# Patient Record
Sex: MALE | Race: WHITE | Employment: STUDENT | URBAN - NONMETROPOLITAN AREA
[De-identification: names, ages, dates, MRNs, and addresses within clinical notes are randomized per-mention and may not be internally consistent; named-entity substitution may affect disease eponyms.]

---
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
## 2022-03-04 ENCOUNTER — HOSPITAL ENCOUNTER (OUTPATIENT)
Dept: GENERAL RADIOLOGY | Age: 22
Discharge: HOME | End: 2022-03-06
Payer: COMMERCIAL

## 2022-03-04 ENCOUNTER — OFFICE VISIT (OUTPATIENT)
Dept: PRIMARY CARE CLINIC | Age: 22
End: 2022-03-04
Payer: COMMERCIAL

## 2022-03-04 VITALS
OXYGEN SATURATION: 99 % | TEMPERATURE: 96 F | HEIGHT: 73 IN | BODY MASS INDEX: 23.86 KG/M2 | SYSTOLIC BLOOD PRESSURE: 118 MMHG | WEIGHT: 180 LBS | DIASTOLIC BLOOD PRESSURE: 68 MMHG | HEART RATE: 56 BPM

## 2022-03-04 DIAGNOSIS — S62.501A CLOSED AVULSION FRACTURE OF PHALANX OF RIGHT THUMB, INITIAL ENCOUNTER: Primary | ICD-10-CM

## 2022-03-04 DIAGNOSIS — R52 PAIN: ICD-10-CM

## 2022-03-04 PROCEDURE — L3908 WHO COCK-UP NONMOLDE PRE OTS: HCPCS | Performed by: NURSE PRACTITIONER

## 2022-03-04 PROCEDURE — 73130 X-RAY EXAM OF HAND: CPT

## 2022-03-04 PROCEDURE — 99213 OFFICE O/P EST LOW 20 MIN: CPT | Performed by: NURSE PRACTITIONER

## 2022-03-04 PROCEDURE — 99212 OFFICE O/P EST SF 10 MIN: CPT | Performed by: NURSE PRACTITIONER

## 2022-03-04 ASSESSMENT — PATIENT HEALTH QUESTIONNAIRE - PHQ9
SUM OF ALL RESPONSES TO PHQ QUESTIONS 1-9: 0
SUM OF ALL RESPONSES TO PHQ9 QUESTIONS 1 & 2: 0
SUM OF ALL RESPONSES TO PHQ QUESTIONS 1-9: 0
2. FEELING DOWN, DEPRESSED OR HOPELESS: 0
1. LITTLE INTEREST OR PLEASURE IN DOING THINGS: 0
SUM OF ALL RESPONSES TO PHQ QUESTIONS 1-9: 0
SUM OF ALL RESPONSES TO PHQ QUESTIONS 1-9: 0

## 2022-03-04 NOTE — PROGRESS NOTES
reviewed. Subjective:      Review of Systems   Constitutional: Negative for activity change, appetite change, fatigue and fever. Musculoskeletal:        Right thumb pain   Neurological: Negative for tingling and numbness. Objective:      Physical Exam  Vitals and nursing note reviewed. Constitutional:       Appearance: Normal appearance. HENT:      Head: Normocephalic and atraumatic. Cardiovascular:      Rate and Rhythm: Normal rate and regular rhythm. Heart sounds: Normal heart sounds. Pulmonary:      Breath sounds: Normal breath sounds. Musculoskeletal:      Right hand: Tenderness present. Decreased range of motion (due to pain). Normal strength. Normal sensation. Arms:    Skin:     Capillary Refill: Capillary refill takes less than 2 seconds. Neurological:      General: No focal deficit present. Mental Status: He is alert and oriented to person, place, and time. XR HAND RIGHT (MIN 3 VIEWS)  Narrative: EXAMINATION:  THREE XRAY VIEWS OF THE RIGHT HAND    3/4/2022 4:35 pm    COMPARISON:  None. HISTORY:  ORDERING SYSTEM PROVIDED HISTORY: Pain  TECHNOLOGIST PROVIDED HISTORY:  thumb pain x3 weeks hit against a backboard  Reason for Exam: recheck of 1st digit injury 3 weeks ago    FINDINGS:  There is a small crescentic opacity that is seen along the radial aspect of  the thumb just proximal to the metacarpophalangeal joint. In the appropriate  clinical setting this could represent a small avulsion fracture. Impression: Possible small avulsion fracture as described above        Assessment:       Diagnosis Orders   1. Closed avulsion fracture of phalanx of right thumb, initial encounter  Woo Castellano MD, Orthopedic Surgery, [place]   2.  Pain  XR HAND RIGHT (MIN 3 VIEWS)       Plan:      Reviewed hand xray with patient  Thumb spica applied  Will refer to orthopedics  Ice to affected area  Tylenol as needed  Return if symptoms do not improve or worsen Return PRN   No follow-ups on file. Orders Placed This Encounter   Procedures    XR HAND RIGHT (MIN 3 VIEWS)     Standing Status:   Future     Number of Occurrences:   1     Standing Expiration Date:   3/4/2023     Order Specific Question:   Reason for exam:     Answer:   thumb pain x3 weeks hit against a backboard   Maninder Velez MD, Orthopedic Surgery, [place]     Referral Priority:   Routine     Referral Type:   Eval and Treat     Referral Reason:   Specialty Services Required     Referred to Provider:   Mich Miranda MD     Requested Specialty:   Orthopedic Surgery     Number of Visits Requested:   1     No orders of the defined types were placed in this encounter. Patient given educational materials - see patient instructions. All patient questionsanswered. Pt voiced understanding. Reviewed health maintenance.      Electronically signed by ROJAS Giraldo - CNP, CNP on 3/4/2022 at 5:54 PM

## 2022-03-10 ENCOUNTER — OFFICE VISIT (OUTPATIENT)
Dept: ORTHOPEDIC SURGERY | Age: 22
End: 2022-03-10
Payer: COMMERCIAL

## 2022-03-10 VITALS
BODY MASS INDEX: 23.86 KG/M2 | SYSTOLIC BLOOD PRESSURE: 134 MMHG | DIASTOLIC BLOOD PRESSURE: 89 MMHG | HEART RATE: 61 BPM | WEIGHT: 180 LBS | HEIGHT: 73 IN

## 2022-03-10 DIAGNOSIS — S63.641A SPRAIN OF METACARPOPHALANGEAL (MCP) JOINT OF RIGHT THUMB, INITIAL ENCOUNTER: Primary | ICD-10-CM

## 2022-03-10 PROCEDURE — 99212 OFFICE O/P EST SF 10 MIN: CPT | Performed by: FAMILY MEDICINE

## 2022-03-10 PROCEDURE — 99203 OFFICE O/P NEW LOW 30 MIN: CPT | Performed by: FAMILY MEDICINE

## 2022-03-10 PROCEDURE — L3924 HFO WITHOUT JOINTS PRE OTS: HCPCS | Performed by: FAMILY MEDICINE

## 2022-03-10 NOTE — PROGRESS NOTES
Sports Medicine Consultation    CHIEF COMPLAINT:  Hand Injury (right thumb injury/ 3week old injury)    HPI:  The patient is a 25 y.o. male who is being seen for  new patient being seen for regarding new problem of  Right 1st finger pain. The patient is a right hand dominant male who has had right hand/wrist pain for 1 montn. As far as trauma to the hand the patient indicates playing basket ball when he blocked a shot and five minutes later was in pain, next morning hand looked purple and swollen  The following medications/interventions have been tried: tylenol, wrapped up for work and ice at night without benefit. Denies numbness/ tingling. he has a past medical history of Asthma. he has no past surgical history on file. family history is not on file. Social History     Socioeconomic History    Marital status: Single     Spouse name: Not on file    Number of children: Not on file    Years of education: Not on file    Highest education level: Not on file   Occupational History    Not on file   Tobacco Use    Smoking status: Never Smoker    Smokeless tobacco: Never Used   Substance and Sexual Activity    Alcohol use: No    Drug use: No    Sexual activity: Not on file   Other Topics Concern    Not on file   Social History Narrative    Not on file     Social Determinants of Health     Financial Resource Strain:     Difficulty of Paying Living Expenses: Not on file   Food Insecurity:     Worried About Running Out of Food in the Last Year: Not on file    Mague of Food in the Last Year: Not on file   Transportation Needs:     Lack of Transportation (Medical): Not on file    Lack of Transportation (Non-Medical):  Not on file   Physical Activity:     Days of Exercise per Week: Not on file    Minutes of Exercise per Session: Not on file   Stress:     Feeling of Stress : Not on file   Social Connections:     Frequency of Communication with Friends and Family: Not on file    nausea or vomiting, and blood in stools. :  Denies frequent urination, burning or painful urination, blood in the urine, and bladder incontinence. NEURO:  Denies headache, memory loss, sleep disturbance, and tremor or movement disorder. PHYSICAL EXAM:    SKIN:  Intact without rashes, lesions or ulcerations. No obvious deformity or swelling. EYES:  Extraocular muscles intact. MOUTH: Oral mucosa moist.  No perioral lesions. PULM:  Respirations unlabored and regular. VASC:  Capillary refill less than 2 seconds. Hand/Wrist Exam  ROM:  Full flexor and extensor tendon function intact   Finger, hand, and wrist range of motion are WNL  Inspection-Deformity: yes  Palpation-Tenderness: rcl at mcp  There is is not thenar and is not interosseous atrophy. Strength- WNL  NEURO: Radial, ulnar, and median nerves are intact to motor and sensory testing. Two point discrimination is less than six mm. There is not decreased sensation to light touch and pinprick in the median and ulnar nerve distribution. CTS: Tinel's test at the wrist is negative. Flexion compression test negative  Phalen's test is negative. Finkelstein's test is negative. Elbow:  Range of motion and strength about the elbow is intact. Tinel's test is negative at the elbow. Cerv:  Full pain free range of motion with a negative Spurling's test.    RADIOLOGY: No results found. IMPRESSION:     1. Sprain of metacarpophalangeal (MCP) joint of right thumb, initial encounter        PLAN:   We discussed some of the etiologies and natural histories of     ICD-10-CM    1. Sprain of metacarpophalangeal (MCP) joint of right thumb, initial encounter  S63.641A Hfo without joints pre ots 2     We discussed the various treatment alternatives including anti-inflammatory medications, physical therapy, injections, further imaging studies and as a last resort surgery.   This point patient has what appears to be sprain of the RCL at the MCP I think we can treat him conservatively with a thumb spica splint I think the calcification is from the healing reaction being a month out we will place him in that splint and see him back otherwise as needed    No follow-ups on file. Please be aware portions of this note were completed using voice recognition software and unforeseen errors may have occurred    Electronically signed by Omid Mejía DO, FAOASM  on 3/10/22 at 9:59 AM EST          Procedures    Hfo without joints pre ots 2     Patient was prescribed a Exos Short Thumb Spica Brace. The right  thumb will require stabilization / immobilization from this semi-rigid / rigid orthosis to improve their function. The orthosis will assist in protecting the affected area, provide functional support and facilitate healing. The orthosis used a heating element to mold and shape the brace to provide a customizable fit for the patient. The patient was educated and fit by a healthcare professional with expert knowledge and specialization in brace application while under the direct supervision of the treating physician. Verbal and written instructions for the use of and application of this item were provided.    They were instructed to contact the office immediately should the brace result in increased pain, decreased sensation, increased swelling or worsening of the condition

## 2022-03-31 ENCOUNTER — OFFICE VISIT (OUTPATIENT)
Dept: ORTHOPEDIC SURGERY | Age: 22
End: 2022-03-31
Payer: COMMERCIAL

## 2022-03-31 VITALS
BODY MASS INDEX: 23.86 KG/M2 | WEIGHT: 180 LBS | HEART RATE: 64 BPM | HEIGHT: 73 IN | SYSTOLIC BLOOD PRESSURE: 120 MMHG | DIASTOLIC BLOOD PRESSURE: 72 MMHG

## 2022-03-31 DIAGNOSIS — S63.641A SPRAIN OF METACARPOPHALANGEAL (MCP) JOINT OF RIGHT THUMB, INITIAL ENCOUNTER: Primary | ICD-10-CM

## 2022-03-31 PROCEDURE — 99212 OFFICE O/P EST SF 10 MIN: CPT | Performed by: FAMILY MEDICINE

## 2022-03-31 PROCEDURE — 99213 OFFICE O/P EST LOW 20 MIN: CPT | Performed by: FAMILY MEDICINE

## 2022-03-31 NOTE — PROGRESS NOTES
Sports Medicine Consultation    CHIEF COMPLAINT:  Finger Pain (rech right thumb)      HPI:  The patient is a 23 y.o. male who is being seen for   established patient being seen for regarding follow up of a pre-existing problem of  r thumb. The patient is a right hand dominant male who has had right hand/wrist pain for 8 weeks. As far as trauma to the hand the patient indicates pain after blocking a shot playing basketball. The following medications/interventions have been tried: spint without benefit. he has a past medical history of Asthma. he has no past surgical history on file. family history is not on file. Social History     Socioeconomic History    Marital status: Single     Spouse name: Not on file    Number of children: Not on file    Years of education: Not on file    Highest education level: Not on file   Occupational History    Not on file   Tobacco Use    Smoking status: Never Smoker    Smokeless tobacco: Never Used   Substance and Sexual Activity    Alcohol use: No    Drug use: No    Sexual activity: Not on file   Other Topics Concern    Not on file   Social History Narrative    Not on file     Social Determinants of Health     Financial Resource Strain:     Difficulty of Paying Living Expenses: Not on file   Food Insecurity:     Worried About Running Out of Food in the Last Year: Not on file    Mague of Food in the Last Year: Not on file   Transportation Needs:     Lack of Transportation (Medical): Not on file    Lack of Transportation (Non-Medical):  Not on file   Physical Activity:     Days of Exercise per Week: Not on file    Minutes of Exercise per Session: Not on file   Stress:     Feeling of Stress : Not on file   Social Connections:     Frequency of Communication with Friends and Family: Not on file    Frequency of Social Gatherings with Friends and Family: Not on file    Attends [employer] Services: Not on file   [employer] of Clubs or Organizations: Not on file    Attends Club or Organization Meetings: Not on file    Marital Status: Not on file   Intimate Partner Violence:     Fear of Current or Ex-Partner: Not on file    Emotionally Abused: Not on file    Physically Abused: Not on file    Sexually Abused: Not on file   Housing Stability:     Unable to Pay for Housing in the Last Year: Not on file    Number of Tj in the Last Year: Not on file    Unstable Housing in the Last Year: Not on file       Current Outpatient Medications   Medication Sig Dispense Refill    VITAMIN D PO Take by mouth      Ascorbic Acid (VITAMIN C ER PO) Take by mouth      Fexofenadine HCl (ALLEGRA ALLERGY PO) Take 1 tablet by mouth       ondansetron (ZOFRAN-ODT) 4 MG disintegrating tablet Take 1 tablet by mouth every 8 hours as needed for Nausea or Vomiting 15 tablet 0    fluticasone (FLONASE) 50 MCG/ACT nasal spray INHALE 1 SPRAY IN EACH NOSTRIL ONE TIME A DAY  11    VENTOLIN  (90 Base) MCG/ACT inhaler INHALE 2 PUFFS ORALLY EVERY FOUR HOURS AS NEEDED  2    loratadine (CLARITIN) 10 MG capsule Take 10 mg by mouth daily       albuterol (PROVENTIL) (2.5 MG/3ML) 0.083% nebulizer solution Take 3 mLs by nebulization every 6 hours as needed for Wheezing or Shortness of Breath 120 vial 0     No current facility-administered medications for this visit. Allergies:  heis allergic to other and bee venom. ROS:  CV:  Denies chest pain; palpitations; shortness of breath; swelling of feet, ankles; and loss of consciousness. CON: Denies fever and dizziness. ENT:  Denies hearing loss / ringing, ear infections hoarseness, and swallowing problems. RESP:  Denies chronic cough, spitting up blood, and asthma/wheezing. GI: Denies abdominal pain, change in bowel habits, nausea or vomiting, and blood in stools. :  Denies frequent urination, burning or painful urination, blood in the urine, and bladder incontinence.   NEURO:  Denies headache, memory loss, sleep disturbance, and tremor or movement disorder. PHYSICAL EXAM:    SKIN:  Intact without rashes, lesions or ulcerations. No obvious deformity or swelling. EYES:  Extraocular muscles intact. MOUTH: Oral mucosa moist.  No perioral lesions. PULM:  Respirations unlabored and regular. VASC:  Capillary refill less than 2 seconds. Hand/Wrist Exam  ROM:  Full flexor and extensor tendon function intact   Finger, hand, and wrist range of motion are WNL  Inspection-Deformity: no  Palpation-Tenderness: no  There is is not thenar and is not interosseous atrophy. Strength- WNL  NEURO: Radial, ulnar, and median nerves are intact to motor and sensory testing. Two point discrimination is less than six mm. There is not decreased sensation to light touch and pinprick in the median and ulnar nerve distribution. CTS: Tinel's test at the wrist is negative. Flexion compression test negative  Phalen's test is negative. Finkelstein's test is negative. Elbow:  Range of motion and strength about the elbow is intact. Tinel's test is negative at the elbow. Cerv:  Full pain free range of motion with a negative Spurling's test.    RADIOLOGY: No results found. IMPRESSION:     1. Sprain of metacarpophalangeal (MCP) joint of right thumb, initial encounter        PLAN:   We discussed some of the etiologies and natural histories of     ICD-10-CM    1. Sprain of metacarpophalangeal (MCP) joint of right thumb, initial encounter  I39.936P      We discussed the various treatment alternatives including anti-inflammatory medications, physical therapy, injections, further imaging studies and as a last resort surgery. At this point his stability is completely returned he really has no significant pain on exam progressing out of splint allow him to return to activity encouragement was given his follow-up will be otherwise as needed    No follow-ups on file.     Please be aware portions of this note were completed using voice recognition software and unforeseen errors may have occurred    Electronically signed by Andreas Lr DO, FAOASM  on 3/31/22 at 8:55 AM EDT      No orders of the defined types were placed in this encounter.

## 2023-01-19 ENCOUNTER — HOSPITAL ENCOUNTER (OUTPATIENT)
Dept: PHYSICAL THERAPY | Facility: CLINIC | Age: 23
Setting detail: THERAPIES SERIES
Discharge: HOME | End: 2023-01-19
Payer: COMMERCIAL

## 2023-01-19 PROCEDURE — 97161 PT EVAL LOW COMPLEX 20 MIN: CPT

## 2023-01-19 PROCEDURE — 97110 THERAPEUTIC EXERCISES: CPT

## 2023-01-19 NOTE — CONSULTS
[facility] &  Therapy  [address]  [phone]  F: [phone] [] [address] [facility] [address]   [address]  P: [phone]  F: [phone] [] [place] &  Therapy  [address] [facility]  P: [phone]  F: [phone] [x] [address]  P: [phone]  F: [phone] [] [address]  [facility]   [address]   [place]: [phone]  F: [phone]      Physical Therapy Foot and Ankle Evaluation    Date:  2023  Patient: Camille Torres  : 2000  MRN: [id number]  Physician: Romeo Andres   Insurance: [employer]- 61 visits- combined- [employer]  Medical Diagnosis: M25.579 (ICD-10-CM) - Pain in ankle    Rehab Codes: M25.579 (ICD-10-CM) - Pain in ankle; R26.2; M62.81; M76.70  Onset date: 2022; see below  Next Dr's appt.: end of 2023    Subjective:   CC:    L > R wrist pain- worse with guitar playing; joint pain- plan to focus upon B foot/ankle pain today- see below. L ankle pain began this past July with attempting to stand from a table, however, L foot/ankle became entangled around the table leg and pain/symptoms immediately began. Symptoms did not improve- attempted to run- and then R foot/ankle pain began. Denies h/o of these pain and symptoms- as well as PT services. Visited chiro- issued orthotics- slight relief- however- continued pain with \"lifting something; applying any type of pressure. \"     HPI: 2022; see above     PMHx: [x] Unremarkable [] Diabetes [] HTN  [] Pacemaker   [] MI/Heart Problems [] Cancer [] Arthritis [x] Other: food allergies to peanuts, tree nuts, seafood; h/o PT services in middle school for adverse reaction/loss of walking post spinal tap.                 [x] Refer to full medical chart  In EPIC     Tests: [x] X-Ray: [] MRI:  [] Other: nothing significant for either foot or ankle. 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 Comorbidities: NONE  [] Obesity [] Dialysis  [] Other:   [] Asthma/COPD [] Dementia [] Other:   [] Stroke [] Sleep apnea [] Other:   [] Vascular disease [] Rheumatic disease [] Other:     ADL/IADL Previous level of function Current level of function Who currently assists the patient with task   Bathing  [x] Independent  [] Assist [x] Independent  [] Assist    Dress/grooming [x] Independent  [] Assist [x] Independent  [] Assist    Transfer/mobility [x] Independent  [] Assist [x] Independent  [] Assist    Feeding [x] Independent  [] Assist [x] Independent  [] Assist    Toileting [x] Independent  [] Assist [x] Independent  [] Assist    Driving [x] Independent  [] Assist [x] Independent  [] Assist    Housekeeping [x] Independent  [] Assist [x] Independent  [] Assist    Grocery shop/meal prep [x] Independent  [] Assist [x] Independent  [] Assist      Gait Prior level of function Current level of function    [x] Independent  [] Assist [x] Independent  [] Assist   Device: [] Independent [] Independent    [] Straight Cane [] Quad cane [] Straight Cane [] Quad cane    [] Standard walker [] Rolling walker   [] 4 wheeled walker [] Standard walker [] Rolling walker   [] 4 wheeled walker    [] Wheelchair [] Wheelchair     Denies donning braces or sleeves upon either foot/ankle. Intermittent pain and difficulty with stair negotiation. Intermittent pain with ambulation.      Pain:  [x] Yes  [] No Location: L > R peroneal distribution [intermittent into achilles- L only]; denies heel and arch pain  Pain Rating: (0-10 scale) 0-7-8/10- over this past week   Pain altered Tx:  [] Yes  [x] No  Action: N/A  Symptoms:  [] Improving [] Worsening [x] Same  Better:  [] AM    [] PM    [] Sit    [] Rise/Sit    []Stand    [] Walk    [] Lying    [x] Other: no relief with cryotherapy. Worse: [] AM    [] PM    [] Sit    [] Rise/Sit    []Stand    [] Walk    [] Lying    [] Bend                             [] Valsalva    [x] Other: reports pain with hunting; unable to run or play basketball for past several months secondary to inc pain and symptoms. \"Weird rotations when walking. \"   Sleep: [x] OK    [] Disturbed    Frequent clicking, popping of each foot/ankle- no pain and no relief. Denies edema, ecchymosis. Denies N/T, changes in sensation.    Denies h/o B LE DVT, PE.     Gait: NO SIG DEVIATIONS      Left Right Comments   WB status      Assistive device      Deviations      Other        Visual inspection:     Left Right Comments   Alignment- varus/valgus/pes  planus/pes cavus x x Slight pes planus B   Scars/surgical incisions      Ecchymosis      Erythema      Pitting edema      Atrophy      Deformity- danisha etc      Other        Pitting Edema Scale: NONE    Grade Definition   1+ 2mm or less; disappears immediately   2+ 2-4mm; few second rebound   3+ 4-6mm; 10-12 second rebound   4+ 6-8mm; > 20 second rebound     Girth measurements: NONE     Left Right Comments   Figure eight      Malleoli      Metatarsals      Midfoot      Other        Objective:      ROM  ° A/P STRENGTH TESTS (+/-) Left Right Not Tested    Left Right Left Right       Hip Flex           Ext           ER           IR           ABD           ADD           Knee Flex           Ext                      Ankle DF 5; neutral  15; 10 4+ 4+ Anterior drawer test   x   PF 70 70 4+ 4+ Inversion stress test   x   INV <10 20 4 4+; P achilles  Eversion stress test   x   EVER < 10; P 15 4-; P 4 Spring test   x   1st MTP Flexion     Talar tilt   x   1st MTP Extension     Medardo's Sign - -    Standing  gastrocnemius - - 12/20- ceased d/t worsening P- \"shoots up the side\"  20/20 Flor's test - -      Bony Palpation:     Left Right Comments   First MTP      Navicular      Dome of Talus      Medial Malleolus      Lateral Malleolus      Fifth metatarsal      Calcaneus      Sesamoid bones      Metatarsals      Subtalar joint      Sustentaculum shelly      Other        Hypomobile L TCJ with AP, PA glides. Also limited subtalar mobility- both INV/EV glides of calcaneus. Soft Tissue Palpation:     Left Right Comments   Medial malleolus-  PTT, FDL, PTA, TN,  FHL        Lateral malleolus-  ATFL, CFL,  PTFL      Dorsum foot- TAA,  EHL,  DPA,  EDL      Plantar fascia/calcaneus      Other        Denies to all lorenza and soft tissue structures of both feet/ankles. BALANCE/PROPRIOCEPTION              [] Not tested   Single leg stance       R                     L                                PAIN   Eyes open                30             Sec. 30         Sec                  . [x] \"Ache\" along medial and lateral joint lines    Eyes closed                          Sec. Sec                  . []      FUNCTION Normal Difficult Unable   Sitting [x] [] []   Standing [x] [] []   Ambulation [] [x] []   Groom/Dress [x] [] []   Lift/Carry [x] [] []   Stairs [] [x] []   Bending [] [x] []   Squat [] [x] []   Kneel [] [x] []     Comments: LEFs is 24% impaired or limited     Assessment:  Patient is a 23 y.o. pleasant male who presents to physical therapy initial evaluation with signs and symptoms consistent with potential L TCJ hypomobility with peroneal tendinitis; concurrent L wrist pain [to be assessed]; desires to run, play basketball without pain. Patient demonstrates impairments and symptoms as detailed below in therapy goals.  Patient currently limited in ambulation, stair negotiation, running, basketball secondary to these impairments and increased symptoms. Patient would benefit from continued physical therapy services in order to address these impairments and symptoms, and return to QOL, ADLs, work. Anticipated frequency detailed above. Prognosis not limited d/t secondary factors- justifying a low complexity evaluation. If unable to achieve significant improvements within six to twelve visits, will consult referring physician and consider a follow-up visit with said physician. Patient verbalized understanding and agreement to all aforementioned statements. Would benefit from PT services in order to: inc L foot/ankle A/PROM; L foot/ankle gross strength; and overall improve tolerance for ambulation, stair negotiation, running and basketball. Problems:    [x] ? Pain:     [x] ? ROM:    [x] ? Strength:    [x] ? Function:    [x] ? Balance  [] Edema:  [x] Postural Deviations  [] Gait Deviations  [] Other:      STG: (to be met in 6 treatments)  ? Pain: Patient will report < 5/10 pain with active movement [ambulation] in order to improve QOL. ? ROM: Will demo L ankle AROM- all- to near match R in order to improve functional mobility. Will also deny P with AROM L ankle eversion in order to indicate dec inflammation within peroneals. ? Strength: Will demo 4+/5 L foot/ankle gross strength without P- especially resisted EV. Will demo x20 SL HR of L LE with < 5/10 P for improved stair negotiation. ? Function: Will ambulate x20 minutes with < 3/10 P for improved QOL. Independent with Home Exercise Programs  LTG: (to be met in 12 treatments)  Will run x1 mile with < 3/10 P for improved tolerance for routine fitness. Improve LEFS to < 24% impaired or limited                  Patient goals: \"to be able to run with no pain and not have this come back; also hurts at random times- so hopefully this helps. \"     Rehab Potential:  [x] Good  [] Fair  [] Poor   Suggested Professional Referral:  [x] No  [] Yes:  Barriers to Goal Achievement:  [x] No  [] Yes:  Domestic Concerns:  [x] No  [] Yes:    Pt. Education:  [x] Plans/Goals, Risks/Benefits discussed  [x] Home exercise program    Method of Education: [x] Verbal  [x] Demo  [x] Written  Comprehension of Education:  [x] Verbalizes understanding. [] Demonstrates understanding. [x] Needs Review. [] Demonstrates/verbalizes understanding of HEP/Ed previously given. Treatment Plan:  [x] Therapeutic Exercise   34701  [] Iontophoresis: 4 mg/mL Dexamethasone Sodium Phosphate  mAmin  73027   [x] Therapeutic Activity  95857 [] Vasopneumatic cold with compression  17155    [x] Gait Training- running mechanics  65499 [] Ultrasound   66068   [x] Neuromuscular Re-education  31208 [] Electrical Stimulation Unattended  83198   [x] Manual Therapy  33686 [] Electrical Stimulation Attended  43478   [x] Instruction in HEP  [] Lumbar/Cervical Traction  [id number]   [] Aquatic Therapy   89052 [x] Cold/hotpack    [] Massage   01058      [x] Dry Needling, 1 or 2 muscles  89583   [] Biofeedback, first 15 minutes   68931  [] Biofeedback, additional 15 minutes   23571 [x] Dry Needling, 3 or more muscles  46462     []  Medication allergies reviewed for use of    Dexamethasone Sodium Phosphate 4mg/ml     with iontophoresis treatments. Pt is not allergic. Frequency: 2 x/week for 12 visits    Todays Treatment:  Modalities: may begin vaso to L foot/ankle- next visit   Precautions: potential L TCJ hypomobility with peroneal tendinitis; concurrent L wrist pain [to be assessed]; desires to run, play basketball without pain   Exercises:  Exercise Reps/ Time Weight/ Level Comments                                 Other: Primary PT to assess wrist pain at next visit or PRN. Specific Instructions for next treatment: Consider upright bike; review of active HEP interventions; L TCJ AP glides, distraction, calcaneal mobs into INV/EV; and finish with vaso next visit.      Access Code: [email or web address]  URL: [email or web address]  Date: 01/19/2023  Prepared by: Kayla Gonzales    Exercises  Isometric Ankle Eversion at Wall - 2 x daily - 7 x weekly - 2 sets - 10 reps - 5 sec hold  Long Sitting Calf Stretch with Strap - 2 x daily - 7 x weekly - 1 sets - 3 reps - 30 sec hold  Seated Ankle Dorsiflexion Stretch - 2 x daily - 7 x weekly - 2 sets - 10 reps  Gastroc Stretch on Wall - 2 x daily - 7 x weekly - 1 sets - 3 reps - 30 sec hold  Soleus Stretch on Wall - 2 x daily - 7 x weekly - 1 sets - 3 reps - 30 sec hold  Seated Great Toe Extension - 2 x daily - 7 x weekly - 2 sets - 10 reps  Seated Lesser Toes Extension - 2 x daily - 7 x weekly - 2 sets - 10 reps  Arch Lifting - 2 x daily - 7 x weekly - 2 sets - 10 reps    Evaluation Complexity:  History (Personal factors, comorbidities) [x] 0 [] 1-2 [] 3+   Exam (limitations, restrictions) [] 1-2 [x] 3 [] 4+   Clinical presentation (progression) [x] Stable [] Evolving  [] Unstable   Decision Making [x] Low [] Moderate [] High    [x] Low Complexity [] Moderate Complexity [] High Complexity     Treatment Charges: Mins Units   [x] Evaluation       []  Low       []  Moderate       []  High 46 1   []  Modalities     [x]  Ther Exercise 10 1   []  Manual Therapy     []  Ther Activities     []  Aquatics     []  Vasocompression     []  Other       TOTAL TREATMENT TIME: 56    Time in: 1:02PM   Time Out: 1:58PM    Electronically signed by: Kalya Gonzales, PT

## 2023-01-24 ENCOUNTER — HOSPITAL ENCOUNTER (OUTPATIENT)
Dept: PHYSICAL THERAPY | Facility: CLINIC | Age: 23
Setting detail: THERAPIES SERIES
Discharge: HOME | End: 2023-01-24
Payer: COMMERCIAL

## 2023-01-24 PROCEDURE — 97016 VASOPNEUMATIC DEVICE THERAPY: CPT

## 2023-01-24 PROCEDURE — 97110 THERAPEUTIC EXERCISES: CPT

## 2023-01-24 NOTE — FLOWSHEET NOTE
[] [facility]) - [facility] &  Therapy  [address]  [phone]  F: [phone] [] [address]  [facility] [address]   [address]  P: [phone]  F: [phone] [] [place] &  Therapy  [address]  P: [phone]  F: [phone] [x] [address]  P: [phone]  F: [phone] [] [address]  [facility]   [address]   [place]: [phone]  F: [phone]      Physical Therapy Daily Treatment Note    Date:  2023  Patient Name:  Jody Martínez    :  2000  MRN: [id number]  Physician: Nadya GUZMAN 6.: Gerardo Gupta- 61 visits- combined- [employer]ARD MAX  Medical Diagnosis: M25.579 (ICD-10-CM) - Pain in ankle                Rehab Codes: M25.579 (ICD-10-CM) - Pain in ankle; R26.2; M62.81; M76.70  Onset date: 2022; see below                 Next Dr's appt.: end of 2023  Visit# / total visits: 2/ 12  Cancels/No Shows: 0/0    Subjective:    Pain:  [x] Yes  [] No  Location: L ankle  Pain Rating: (0-10 scale) 4/10  Pain altered Tx:  [x] No  [] Yes  Action: N/A  Comments: Pt reports he has noticed pain in R ankle as well, in the same place.      Objective:  Modalities: Vaso to L ankle: 34 deg, MED for x15' post   Precautions: potential L TCJ hypomobility with peroneal tendinitis; concurrent L wrist pain [to be assessed]; desires to run, play basketball without pain   Exercises:  Exercise Reps/ Time Weight/ Level Comments    Bike  6' Seat 4  x           D1/D2 PNF Pattern  20x     x   Gastroc str  3x30\"   x   Soleus str  3x30\"   x                 SLS eyes closed   2x30\"     x   3 way reach    2x10 ea      x   Eccentric heel raise   2x10 ea      x              Other: Primary PT to assess wrist pain at next visit or PRN. Manual:  A/P Mobes to R ankle  (consider bilat next) in supine and in standing. LAD to R TCJ     Specific Instructions for next treatment: Consider calcaneal mobs into INV/EV next visit. Treatment Charges: Mins Units   []  Modalities     [x]  Ther Exercise 40 3   []  Manual Therapy     []  Ther Activities     []  Aquatics     [x]  Vasocompression 15 1   []  Other     Total Treatment time 55 4     Assessment: [x] Progressing toward goals. Began session with upright bike to prepare soft tissue for stretching and strengthening exercises. PTA then completed TCJ mobilizations in supine and in standing with good sophia. Pt then completed D1/D2 PNF for neuro-re-ed and mobility with good sophia. Progressed with standing gastroc and soleus stretches on slantboard as well as SLS exercises for strength and stability of B ankles. Ended with vaso for pain and inflammation management. Updated HEP as listed above with verbal understanding given by pt.     [] No change. [] Other:  [x] Patient would continue to benefit from skilled physical therapy services in order to: inc L foot/ankle A/PROM; L foot/ankle gross strength; and overall improve tolerance for ambulation, stair negotiation, running and basketball. STG: (to be met in 6 treatments)  ? Pain: Patient will report < 5/10 pain with active movement [ambulation] in order to improve QOL. ? ROM: Will demo L ankle AROM- all- to near match R in order to improve functional mobility. Will also deny P with AROM L ankle eversion in order to indicate dec inflammation within peroneals. ? Strength: Will demo 4+/5 L foot/ankle gross strength without P- especially resisted EV. Will demo x20 SL HR of L LE with < 5/10 P for improved stair negotiation. ? Function: Will ambulate x20 minutes with < 3/10 P for improved QOL.    Independent with Home Exercise Programs  LTG: (to be met in 12 treatments)  Will run x1 mile with < 3/10 P for improved tolerance for routine fitness. Improve LEFS to < 24% impaired or limited                  Patient goals: \"to be able to run with no pain and not have this come back; also hurts at random times- so hopefully this helps. \"     Pt. Education:  [x] Yes  [] No  [x] Reviewed Prior HEP/Ed  Method of Education: [] Verbal  [] Demo  [] Written  Comprehension of Education:  [] Verbalizes understanding. [] Demonstrates understanding. [] Needs review. [x] Demonstrates/verbalizes HEP/Ed previously given. Access Code: [email or web address]  URL: [email or web address]  Date: 01/24/2023  Prepared by: Oni Peterson    Exercises  Isometric Ankle Eversion at [address] - 2 x daily - 7 x weekly - 2 sets - 10 reps - 5 sec hold  Long Sitting Calf Stretch with Strap - 2 x daily - 7 x weekly - 1 sets - 3 reps - 30 sec hold  Seated Ankle Dorsiflexion Stretch - 2 x daily - 7 x weekly - 2 sets - 10 reps  Gastroc Stretch on Wall - 2 x daily - 7 x weekly - 1 sets - 3 reps - 30 sec hold  Soleus Stretch on Wall - 2 x daily - 7 x weekly - 1 sets - 3 reps - 30 sec hold  Seated Great Toe Extension - 2 x daily - 7 x weekly - 2 sets - 10 reps  Seated Lesser Toes Extension - 2 x daily - 7 x weekly - 2 sets - 10 reps  Arch Lifting - 2 x daily - 7 x weekly - 2 sets - 10 reps  Standing 3-Way Kick - 1 x daily - 7 x weekly - 2 sets - 10 reps  Single Leg Balance with Eyes Closed - 1 x daily - 7 x weekly - 1 sets - 3 reps - 30 sec hold    Plan: [x] Continue current frequency toward long and short term goals. [x] Specific Instructions for subsequent treatments: Consider calcaneal mobs into INV/EV next visit.      Frequency: 2 x/week for 12 visits     Time In: 3:00PM           Time Out: 4:00PM    Electronically signed by:  Oni Peterson PTA

## 2023-01-26 ENCOUNTER — HOSPITAL ENCOUNTER (OUTPATIENT)
Dept: PHYSICAL THERAPY | Facility: CLINIC | Age: 23
Setting detail: THERAPIES SERIES
Discharge: HOME | End: 2023-01-26
Payer: COMMERCIAL

## 2023-01-26 PROCEDURE — 97140 MANUAL THERAPY 1/> REGIONS: CPT

## 2023-01-26 PROCEDURE — 97110 THERAPEUTIC EXERCISES: CPT

## 2023-01-26 PROCEDURE — 97016 VASOPNEUMATIC DEVICE THERAPY: CPT

## 2023-01-26 NOTE — FLOWSHEET NOTE
[] [facility]  [address]  [phone]  F: [phone] [] [address]  [place] [address]   [address]  P: [phone]  F: [phone] [] [place]  [facility]  [address] [facility]  P: [phone]  F: [phone] [x] [address]  P: [phone]  F: [phone] [] [address]  [facility]   [address]   [place]: [phone]  F: [phone]      Physical Therapy Daily Treatment Note    Date:  2023  Patient Name:  Leelee Cevallos    :  2000  MRN: [id number]  Physician: Nadya GUZMAN 6.: Kaitlin Mis- 61 visits- combined- [facility]  Medical Diagnosis: M25.579 (ICD-10-CM) - Pain in ankle                Rehab Codes: M25.579 (ICD-10-CM) - Pain in ankle; R26.2; M62.81; M76.70  Onset date: 2022; see below                 Next Dr's appt.: end of 2023  Visit# / total visits: 3/ 12  Cancels/No Shows: 0/0    Subjective:    Pain:  [x] Yes  [] No  Location: L > R ankle  Pain Rating: (0-10 scale) 3/10  Pain altered Tx:  [] No  [x] Yes  Action: SEE BELOW FOR MODIFICATIONS   Comments: Reports fair tolerance to first treatment session- \"I was hurting later that night [potentially for a few hours]. \" Pt denied experiencing symptoms of muscle soreness and fatigue- confirmed symptoms of his original pain- will modify per below. Reports attempting 3-way reaches at home and unable to complete without hip compensations.      Objective:  Modalities: Vaso to B ankles: 34 deg, MED for x10' post   Precautions: potential L TCJ hypomobility with peroneal tendinitis; concurrent L wrist pain [to be assessed]; desires to run, play basketball without pain   Exercises:  Exercise Reps/ Time Weight/ Level Comments    Upright bike  x7' Seat 4 Increase seat height to 6 next visit! x          Resisted L ankle EV  2x10 ea  NEW- 1/26/23- denies inc P  x   D1/D2 PNF Pattern- B 2x10 ea   Alternate ankles  x          Seated self-DF mob- upon slide board  x10-15 ea  Immediately post MT- denies change in symptoms  x          Gastroc wedge str  3x30\" ea L3/LOW  x   Soleus wedge str  3x30\" ea L3/LOW  x   SLS eyes closed- B 2x30\" ea   Hands on hips; cues for inc big toe activation  x   3 way reach- B 2x10 ea    Modified to performing hip 3-way from HEP no UE A- 1/26/23 x   Eccentric heel raise  2x10 ea    HELD 1/26/23- may progress to in future  -              Other: Primary PT to assess wrist pain at next visit or PRN. Manual:  B TCJ AP glides in supine- gr. III-IV  B TCJ LAD in supine- gr. III-IV    May progress to [facility] technique in standing in the future. Specific Instructions for next treatment: Follow-up with pt regarding tolerance to last, modified treatment session- continue if proven beneficial or modify PRN next visit. Begin with joint mobilizations, then perform upright bike, then standing stabilization interventions, and finish with vaso next visit. Treatment Charges: Mins Units   []  Modalities     [x]  Ther Exercise 33 2   [x]  Manual Therapy 10 1   []  Ther Activities     []  Aquatics     [x]  Vasocompression 10 1   []  Other     Total Treatment time 53 4     [x7' on upright bike]     Assessment: [x] Progressing toward goals. Pt presents with trace symptoms, but reports fair tolerance to first treatment session. Therefore, modified treatment session today to exclude eccentric gastroc strengthening/heel raises and regressed from 3-way reaches to 3-way hip for improved tolerance and prevention of future pain. Also progressed seated L ankle EV isometrics to include performing against light yellow theraband. Finished with B TCJ AP glides and LAD. Finally, implemented vaso to B ankles.  Denies pain and symptoms at finish. Emphasized only performing original HEP handout while at home for now- and to monitor symptoms post this, modified treatment session. Will change order of interventions and begin with MT next visit. [] No change. [] Other:  [x] Patient would continue to benefit from skilled physical therapy services in order to: inc L foot/ankle A/PROM; L foot/ankle gross strength; and overall improve tolerance for ambulation, stair negotiation, running and basketball. STG: (to be met in 6 treatments)  ? Pain: Patient will report < 5/10 pain with active movement [ambulation] in order to improve QOL. ? ROM: Will demo L ankle AROM- all- to near match R in order to improve functional mobility. Will also deny P with AROM L ankle eversion in order to indicate dec inflammation within peroneals. ? Strength: Will demo 4+/5 L foot/ankle gross strength without P- especially resisted EV. Will demo x20 SL HR of L LE with < 5/10 P for improved stair negotiation. ? Function: Will ambulate x20 minutes with < 3/10 P for improved QOL. Independent with Home Exercise Programs  LTG: (to be met in 12 treatments)  Will run x1 mile with < 3/10 P for improved tolerance for routine fitness. Improve LEFS to < 24% impaired or limited                  Patient goals: \"to be able to run with no pain and not have this come back; also hurts at random times- so hopefully this helps. \"     Pt. Education:  [x] Yes  [] No  [x] Reviewed Prior HEP/Ed  Method of Education: [] Verbal  [] Demo  [] Written  Comprehension of Education:  [] Verbalizes understanding. [] Demonstrates understanding. [] Needs review. [x] Demonstrates/verbalizes HEP/Ed previously given. Access Code: [email or web address]  URL: [email or web address]  Date: 01/24/2023  Prepared by: Oni Peterson    Exercises  Isometric Ankle Eversion at Wall - 2 x daily - 7 x weekly - 2 sets - 10 reps - 5 sec hold  Long Sitting Calf Stretch with Strap - 2 x daily - 7 x weekly - 1 sets - 3 reps - 30 sec hold  Seated Ankle Dorsiflexion Stretch - 2 x daily - 7 x weekly - 2 sets - 10 reps  Gastroc Stretch on Wall - 2 x daily - 7 x weekly - 1 sets - 3 reps - 30 sec hold  Soleus Stretch on Wall - 2 x daily - 7 x weekly - 1 sets - 3 reps - 30 sec hold  Seated Great Toe Extension - 2 x daily - 7 x weekly - 2 sets - 10 reps  Seated Lesser Toes Extension - 2 x daily - 7 x weekly - 2 sets - 10 reps  Arch Lifting - 2 x daily - 7 x weekly - 2 sets - 10 reps  Standing 3-Way Kick - 1 x daily - 7 x weekly - 2 sets - 10 reps  Single Leg Balance with Eyes Closed - 1 x daily - 7 x weekly - 1 sets - 3 reps - 30 sec hold    Plan: [x] Continue current frequency toward long and short term goals. [x] Specific Instructions for subsequent treatments: Follow-up with pt regarding tolerance to last, modified treatment session- continue if proven beneficial or modify PRN next visit. Begin with joint mobilizations, then perform upright bike, then standing stabilization interventions, and finish with vaso next visit.      Frequency: 2 x/week for 12 visits     Time In: 1:30PM      Time Out: 2:30PM    Electronically signed by:  Caryl Reed PT

## 2023-01-31 ENCOUNTER — HOSPITAL ENCOUNTER (OUTPATIENT)
Dept: PHYSICAL THERAPY | Facility: CLINIC | Age: 23
Setting detail: THERAPIES SERIES
Discharge: HOME | End: 2023-01-31
Payer: COMMERCIAL

## 2023-01-31 PROCEDURE — 97140 MANUAL THERAPY 1/> REGIONS: CPT

## 2023-01-31 PROCEDURE — 97110 THERAPEUTIC EXERCISES: CPT

## 2023-01-31 PROCEDURE — 97016 VASOPNEUMATIC DEVICE THERAPY: CPT

## 2023-01-31 NOTE — FLOWSHEET NOTE
[] [facility]  [address]  [phone]  F: [phone] [] [address]  [employer] [address]   [address]  P: [phone]  F: [phone] [] [place] [address]  Therapy  [address] [facility]  P: [phone]  F: [phone] [x] [address]  P: [phone]  F: [phone] [] [address]  [facility]   [address]   [place]: [phone]  F: [phone]      Physical Therapy Daily Treatment Note    Date:  2023  Patient Name:  Vazquez Gant    :  2000  MRN: [id number]  Physician: Nadya GUZMAN 6.: Woodrow Scott- 61 visits- combined- [facility]  Medical Diagnosis: M25.579 (ICD-10-CM) - Pain in ankle                Rehab Codes: M25.579 (ICD-10-CM) - Pain in ankle; R26.2; M62.81; M76.70  Onset date: 2022; see below                 Next Dr's appt.: end of 2023  Visit# / total visits:   Cancels/No Shows: 0/0    Subjective:    Pain:  [x] Yes  [] No  Location: L > R ankle  Pain Rating: (0-10 scale) 0/10 currently (6-7/10 at worst)  Pain altered Tx:  [] No  [x] Yes  Action: SEE BELOW FOR MODIFICATIONS   Comments: Pt arrives reporting no pain currently, but had some pretty intense pain during and after a 4 hour long rabbit hunting session. The pain was off and on until that same evening and completely gone the next morning.      Objective:  Modalities: Vaso to B ankles: 34 deg, MED for x10' post   Precautions: potential L TCJ hypomobility with peroneal tendinitis; concurrent L wrist pain [to be assessed]; desires to run, play basketball without pain   Exercises:  Exercise Reps/ Time Weight/ Level Comments    Upright bike  x7' Seat 6  x          Resisted L ankle EV  2x10 ea  NEW- 23- denies inc P  x   D1/D2 PNF Pattern- B 2x10 ea   Alternate ankles  x          Seated self-DF mob- upon slide board  x10-15 ea  Immediately post MT- denies change in symptoms  x          Gastroc wedge str  3x30\" ea L4  x   Soleus wedge str  3x30\" ea L4  x   SLS eyes closed- B 2x30\" ea   Hands on hips; cues for inc big toe activation  x   3 way reach- B 2x10 ea    Modified to performing hip 3-way from HEP no UE A- 1/26/23 x   Eccentric heel raise  10x ea     x              Other: Primary PT to assess wrist pain at next visit or PRN. Manual:  B TCJ AP glides in supine- gr. III-IV  B TCJ LAD in supine- gr. III-IV    May progress to [facility] technique in standing in the future. Specific Instructions for next treatment: Follow-up with pt regarding tolerance to last, modified treatment session- continue if proven beneficial or modify PRN next visit. Begin with joint mobilizations, then perform upright bike, then standing stabilization interventions, and finish with vaso next visit. Treatment Charges: Mins Units   []  Modalities     [x]  Ther Exercise 20 1   [x]  Manual Therapy 10 1   []  Ther Activities     []  Aquatics     [x]  Vasocompression 10 1   []  Other     Total Treatment time 40 3     [x5' on upright bike]     Assessment: [x] Progressing toward goals. Initiated treatment w/ joint mobilizations as mentioned above with improvements in ROM noted post. Followed w/ therapeutic ex as mentioned above d/t upright bike being occupied. Resumed eccentric heel raises d/t improvement in pain reported. Ended treatment w/ vasocompression for symptom control. Plan to added calcaneal tilt mobilizations next vs.    [] No change. [] Other:  [x] Patient would continue to benefit from skilled physical therapy services in order to: inc L foot/ankle A/PROM; L foot/ankle gross strength; and overall improve tolerance for ambulation, stair negotiation, running and basketball. STG: (to be met in 6 treatments)  ?  Pain: Patient will report < 5/10 pain with active movement [ambulation] in order to improve QOL. ? ROM: Will demo L ankle AROM- all- to near match R in order to improve functional mobility. Will also deny P with AROM L ankle eversion in order to indicate dec inflammation within peroneals. ? Strength: Will demo 4+/5 L foot/ankle gross strength without P- especially resisted EV. Will demo x20 SL HR of L LE with < 5/10 P for improved stair negotiation. ? Function: Will ambulate x20 minutes with < 3/10 P for improved QOL. Independent with Home Exercise Programs  LTG: (to be met in 12 treatments)  Will run x1 mile with < 3/10 P for improved tolerance for routine fitness. Improve LEFS to < 24% impaired or limited                  Patient goals: \"to be able to run with no pain and not have this come back; also hurts at random times- so hopefully this helps. \"     Pt. Education:  [x] Yes  [] No  [x] Reviewed Prior HEP/Ed  Method of Education: [] Verbal  [] Demo  [] Written  Comprehension of Education:  [] Verbalizes understanding. [] Demonstrates understanding. [] Needs review. [x] Demonstrates/verbalizes HEP/Ed previously given. Access Code: [email or web address]  URL: [email or web address]  Date: 01/24/2023  Prepared by: Kong Alvarez    Exercises  Isometric Ankle Eversion at [address] - 2 x daily - 7 x weekly - 2 sets - 10 reps - 5 sec hold  Long Sitting Calf Stretch with Strap - 2 x daily - 7 x weekly - 1 sets - 3 reps - 30 sec hold  Seated Ankle Dorsiflexion Stretch - 2 x daily - 7 x weekly - 2 sets - 10 reps  Gastroc Stretch on Wall - 2 x daily - 7 x weekly - 1 sets - 3 reps - 30 sec hold  Soleus Stretch on Wall - 2 x daily - 7 x weekly - 1 sets - 3 reps - 30 sec hold  Seated Great Toe Extension - 2 x daily - 7 x weekly - 2 sets - 10 reps  Seated Lesser Toes Extension - 2 x daily - 7 x weekly - 2 sets - 10 reps  Arch Lifting - 2 x daily - 7 x weekly - 2 sets - 10 reps  Standing 3-Way Kick - 1 x daily - 7 x weekly - 2 sets - 10 reps  Single Leg Balance with Eyes Closed - 1 x daily - 7 x weekly - 1 sets - 3 reps - 30 sec hold    Plan: [x] Continue current frequency toward long and short term goals. [x] Specific Instructions for subsequent treatments: Follow-up with pt regarding tolerance to last, modified treatment session- continue if proven beneficial or modify PRN next visit. Begin with joint mobilizations, then perform upright bike, then standing stabilization interventions, and finish with vaso next visit.      Frequency: 2 x/week for 12 visits     Time In: 1500     Time Out: 7400      Electronically signed by:  Debra Mendez PTA

## 2023-02-02 ENCOUNTER — APPOINTMENT (OUTPATIENT)
Dept: PHYSICAL THERAPY | Facility: CLINIC | Age: 23
End: 2023-02-02
Payer: COMMERCIAL

## 2023-02-02 ENCOUNTER — HOSPITAL ENCOUNTER (OUTPATIENT)
Dept: PHYSICAL THERAPY | Facility: CLINIC | Age: 23
Setting detail: THERAPIES SERIES
Discharge: HOME | End: 2023-02-02
Payer: COMMERCIAL

## 2023-02-02 PROCEDURE — 97140 MANUAL THERAPY 1/> REGIONS: CPT

## 2023-02-02 PROCEDURE — 97016 VASOPNEUMATIC DEVICE THERAPY: CPT

## 2023-02-02 PROCEDURE — 97110 THERAPEUTIC EXERCISES: CPT

## 2023-02-02 NOTE — FLOWSHEET NOTE
[] [facility]  [address]  [phone]  F: [phone] [] [address]  [facility]   [address]  P: [phone]  F: [phone] [] [place] [address]  Therapy  [address] [facility]  P: [phone]  F: [phone] [x] [address]  P: [phone]  F: [phone] [] [address]  [facility]   [address]   [place]: [phone]  F: [phone]      Physical Therapy Daily Treatment Note    Date:  2023  Patient Name:  Angel Jerez    :  2000  MRN: [id number]  Physician: Nadya Ellis.: Wendie Nunez 150- 91 visits- combined- [facility]ARD MAX  Medical Diagnosis: M25.579 (ICD-10-CM) - Pain in ankle                Rehab Codes: M25.579 (ICD-10-CM) - Pain in ankle; R26.2; M62.81; M76.70  Onset date: 2022; see below                 Next 's appt.: end of 2023  Visit# / total visits: 5/ 12  Cancels/No Shows: 0/0    Subjective:    Pain:  [x] Yes  [] No  Location: L > R ankle  Pain Rating: (0-10 scale) 2/10 currently (5/10 at worst)  Pain altered Tx:  [] No  [x] Yes  Action: SEE BELOW FOR MODIFICATIONS   Comments: Pt reported that he has some intermittent pain at really random times. Highest pain gets up to 5/10. Currently 2/10.      Objective:  Modalities: Vaso to B ankles: 34 deg, MED for x10' post   Precautions: potential L TCJ hypomobility with peroneal tendinitis; concurrent L wrist pain [to be assessed]; desires to run, play basketball without pain   Exercises:  Exercise Reps/ Time Weight/ Level Comments    Upright bike  x7' Seat 6  x          Resisted L ankle EV  2x10 ea  NEW- 23- denies inc P  x   D1/D2 PNF Pattern- B 2x10 ea   Alternate ankles  x          Seated self-DF mob- upon slide board  x10-15 ea  Immediately post MT- denies change in symptoms  x          Gastroc wedge str  3x30\" ea L4  x   Soleus wedge str  3x30\" ea L4  x   SLS eyes closed- B 2x30\" ea   Hands on hips; cues for inc big toe activation  x   3 way reach- B 2x10 ea    Modified to performing hip 3-way from HEP no UE A- 1/26/23 x   Eccentric heel raise  10x ea     x              Other: Primary PT to assess wrist pain at next visit or PRN. Manual:  B TCJ AP glides in supine- gr. III-IV  B TCJ LAD in supine- gr. III-IV  Calcaneal tilt- ADD    May progress to [place] technique in standing in the future. Specific Instructions for next treatment: Follow-up with pt regarding tolerance to last, modified treatment session- continue if proven beneficial or modify PRN next visit. Begin with joint mobilizations, then perform upright bike, then standing stabilization interventions, and finish with vaso next visit. Treatment Charges: Mins Units   []  Modalities     [x]  Ther Exercise 20 1   [x]  Manual Therapy 13 1   []  Ther Activities     []  Aquatics     [x]  Vasocompression 10 1   []  Other     Total Treatment time 43 3     [x7' on upright bike]     Assessment: [x] Progressing toward goals. Pt began treatment with joint mobilizations as above, calcaneal tilt held in error. Pt then performed upright bike to improve ROM and tolerance. Pt then completed standing and seated strengthening and stability activities. Pt concluded session with vasocompression to decrease pain level. Pt noted increased pain at the end of treatment up to 4-5/10. [] No change. [] Other:  [x] Patient would continue to benefit from skilled physical therapy services in order to: inc L foot/ankle A/PROM; L foot/ankle gross strength; and overall improve tolerance for ambulation, stair negotiation, running and basketball. STG: (to be met in 6 treatments)  ?  Pain: Patient will report < 5/10 pain with active movement [ambulation] in order to improve QOL. ? ROM: Will demo L ankle AROM- all- to near match R in order to improve functional mobility. Will also deny P with AROM L ankle eversion in order to indicate dec inflammation within peroneals. ? Strength: Will demo 4+/5 L foot/ankle gross strength without P- especially resisted EV. Will demo x20 SL HR of L LE with < 5/10 P for improved stair negotiation. ? Function: Will ambulate x20 minutes with < 3/10 P for improved QOL. Independent with Home Exercise Programs  LTG: (to be met in 12 treatments)  Will run x1 mile with < 3/10 P for improved tolerance for routine fitness. Improve LEFS to < 24% impaired or limited                  Patient goals: \"to be able to run with no pain and not have this come back; also hurts at random times- so hopefully this helps. \"     Pt. Education:  [x] Yes  [] No  [x] Reviewed Prior HEP/Ed  Method of Education: [] Verbal  [] Demo  [] Written  Comprehension of Education:  [] Verbalizes understanding. [] Demonstrates understanding. [] Needs review. [x] Demonstrates/verbalizes HEP/Ed previously given. Access Code: [email or web address]  URL: [email or web address]  Date: 01/24/2023  Prepared by: Fred Figueroa    Exercises  Isometric Ankle Eversion at [address] - 2 x daily - 7 x weekly - 2 sets - 10 reps - 5 sec hold  Long Sitting Calf Stretch with Strap - 2 x daily - 7 x weekly - 1 sets - 3 reps - 30 sec hold  Seated Ankle Dorsiflexion Stretch - 2 x daily - 7 x weekly - 2 sets - 10 reps  Gastroc Stretch on Wall - 2 x daily - 7 x weekly - 1 sets - 3 reps - 30 sec hold  Soleus Stretch on Wall - 2 x daily - 7 x weekly - 1 sets - 3 reps - 30 sec hold  Seated Great Toe Extension - 2 x daily - 7 x weekly - 2 sets - 10 reps  Seated Lesser Toes Extension - 2 x daily - 7 x weekly - 2 sets - 10 reps  Arch Lifting - 2 x daily - 7 x weekly - 2 sets - 10 reps  Standing 3-Way Kick - 1 x daily - 7 x weekly - 2 sets - 10 reps  Single Leg Balance with Eyes Closed - 1 x daily - 7 x weekly - 1 sets - 3 reps - 30 sec hold    Plan: [x] Continue current frequency toward long and short term goals. [x] Specific Instructions for subsequent treatments: Follow-up with pt regarding tolerance to last, modified treatment session- continue if proven beneficial or modify PRN next visit.      Frequency: 2 x/week for 12 visits     Time In: 1500     Time Out: 1550      Electronically signed by:  Radha Pelaez PTA

## 2023-02-07 ENCOUNTER — HOSPITAL ENCOUNTER (OUTPATIENT)
Dept: PHYSICAL THERAPY | Facility: CLINIC | Age: 23
Setting detail: THERAPIES SERIES
Discharge: HOME | End: 2023-02-07
Payer: COMMERCIAL

## 2023-02-07 PROCEDURE — 97110 THERAPEUTIC EXERCISES: CPT

## 2023-02-07 PROCEDURE — 97016 VASOPNEUMATIC DEVICE THERAPY: CPT

## 2023-02-07 PROCEDURE — 97140 MANUAL THERAPY 1/> REGIONS: CPT

## 2023-02-09 ENCOUNTER — HOSPITAL ENCOUNTER (OUTPATIENT)
Dept: PHYSICAL THERAPY | Facility: CLINIC | Age: 23
Setting detail: THERAPIES SERIES
Discharge: HOME | End: 2023-02-09
Payer: COMMERCIAL

## 2023-02-09 PROCEDURE — 97140 MANUAL THERAPY 1/> REGIONS: CPT

## 2023-02-09 PROCEDURE — 97110 THERAPEUTIC EXERCISES: CPT

## 2023-02-09 PROCEDURE — 97016 VASOPNEUMATIC DEVICE THERAPY: CPT

## 2023-02-09 NOTE — FLOWSHEET NOTE
[] [facility] [address] [facility]  [address]  [phone]  F: [phone] [] [address]  [facility]   [address]  P: [phone]  F: [phone] [] [address]  Therapy  [address] [facility]  P: [phone]  F: [phone] [x] [address]  P: [phone]  F: [phone] [] [address]  [facility]   [address]   [place]: [phone]  F: [phone]      Physical Therapy Daily Treatment Note    Date:  2023  Patient Name:  Emelina Coleman    :  2000  MRN: [id number]  Physician: Nadya Ellis.: Ashley Duke- 61 visits- combined- HARD MAX  Medical Diagnosis: M25.579 (ICD-10-CM) - Pain in ankle                Rehab Codes: M25.579 (ICD-10-CM) - Pain in ankle; R26.2; M62.81; M76.70  Onset date: 2022; see below                 Next Dr's appt.: end of 2023  Visit# / total visits:   Cancels/No Shows: 0/0    Subjective:    Pain:  [x] Yes  [] No  Location: L > R ankle  Pain Rating: (0-10 scale) 2/10 currently (5/10 at worst)  Pain altered Tx:  [] No  [x] Yes  Action: SEE BELOW FOR MODIFICATIONS   Comments: Pt reported some minor left ankle pain yesterday that was bothering him in both seated and standing.      Objective:  Modalities: Vaso to B ankles: 34 deg, MED for x10' post   Precautions: potential L TCJ hypomobility with peroneal tendinitis; concurrent L wrist pain [to be assessed]; desires to run, play basketball without pain   Exercises:  Exercise Reps/ Time Weight/ Level Comments COMPLETED   Upright bike  x7' Seat 6  -          Resisted L ankle EV  2x10 ea  NEW- 23- denies inc P  x   D1/D2 PNF Pattern- B 2x10 ea   Alternate ankles  x          Seated self-DF mob- upon slide board  x10-15 ea  Immediately post MT- denies change in symptoms  x          Gastroc wedge str  3x30\" ea L4  x   Soleus wedge str  3x30\" ea L4  x   SLS eyes closed- B 2x30\" ea   Hands on hips; cues for inc big toe activation  x   3 way reach- B 2x10 ea    Modified to performing hip 3-way from HEP no UE A- 1/26/23 x   Eccentric heel raise  10x ea     -   TG DL heel raises/ SL 2x10 ea  L 18   x   Heel raises off stairs DL 15x   x   Other: Primary PT to assess wrist pain at next visit or PRN. Manual:  B TCJ AP glides in supine- gr. III-IV  B TCJ LAD in supine- gr. III-IV  Calcaneal tilt- ADD    May progress to Mulligan technique in standing in the future. Specific Instructions for next treatment: STG/PN next visit. Treatment Charges: Mins Units   []  Modalities     [x]  Ther Exercise 23 1   [x]  Manual Therapy 15 1   []  Ther Activities     []  Aquatics     [x]  Vasocompression 10 1   []  Other     Total Treatment time 48 3     [x7' on upright bike] - held in error    Assessment: [x] Progressing toward goals. Continued with manual as noted above. Pt then completed seated and standing activities to improve LE strength and stability. Pt noted increased pain in L ankle with SL TG heel raises. Pt concluded session with vasocompression to decrease pain and soreness. [] No change. [] Other:  [x] Patient would continue to benefit from skilled physical therapy services in order to: inc L foot/ankle A/PROM; L foot/ankle gross strength; and overall improve tolerance for ambulation, stair negotiation, running and basketball. STG: (to be met in 6 treatments)  ? Pain: Patient will report < 5/10 pain with active movement [ambulation] in order to improve QOL. ? ROM: Will demo L ankle AROM- all- to near match R in order to improve functional mobility. Will also deny P with AROM L ankle eversion in order to indicate dec inflammation within peroneals. ? Strength:  Will demo 4+/5 L foot/ankle gross strength without P- especially resisted EV. Will demo x20 SL HR of L LE with < 5/10 P for improved stair negotiation. ? Function: Will ambulate x20 minutes with < 3/10 P for improved QOL. Independent with Home Exercise Programs  LTG: (to be met in 12 treatments)  Will run x1 mile with < 3/10 P for improved tolerance for routine fitness. Improve LEFS to < 24% impaired or limited                  Patient goals: \"to be able to run with no pain and not have this come back; also hurts at random times- so hopefully this helps. \"     Pt. Education:  [x] Yes  [] No  [x] Reviewed Prior HEP/Ed  Method of Education: [] Verbal  [] Demo  [] Written  Comprehension of Education:  [] Verbalizes understanding. [] Demonstrates understanding. [] Needs review. [x] Demonstrates/verbalizes HEP/Ed previously given. Access Code: [email or web address]  URL: [email or web address]  Date: 01/24/2023  Prepared by: Marcos Cornelius    Exercises  Isometric Ankle Eversion at [address] - 2 x daily - 7 x weekly - 2 sets - 10 reps - 5 sec hold  Long Sitting Calf Stretch with Strap - 2 x daily - 7 x weekly - 1 sets - 3 reps - 30 sec hold  Seated Ankle Dorsiflexion Stretch - 2 x daily - 7 x weekly - 2 sets - 10 reps  Gastroc Stretch on Wall - 2 x daily - 7 x weekly - 1 sets - 3 reps - 30 sec hold  Soleus Stretch on Wall - 2 x daily - 7 x weekly - 1 sets - 3 reps - 30 sec hold  Seated Great Toe Extension - 2 x daily - 7 x weekly - 2 sets - 10 reps  Seated Lesser Toes Extension - 2 x daily - 7 x weekly - 2 sets - 10 reps  Arch Lifting - 2 x daily - 7 x weekly - 2 sets - 10 reps  Standing 3-Way Kick - 1 x daily - 7 x weekly - 2 sets - 10 reps  Single Leg Balance with Eyes Closed - 1 x daily - 7 x weekly - 1 sets - 3 reps - 30 sec hold    Plan: [x] Continue current frequency toward long and short term goals. [x] Specific Instructions for subsequent treatments: STG/PN next visit.      Frequency: 2 x/week for 12 visits     Time In: 1300     Time Out: 1345    Electronically signed by:  Sae Castorena, PTA

## 2023-02-14 ENCOUNTER — HOSPITAL ENCOUNTER (OUTPATIENT)
Dept: PHYSICAL THERAPY | Facility: CLINIC | Age: 23
Setting detail: THERAPIES SERIES
Discharge: HOME | End: 2023-02-14
Payer: COMMERCIAL

## 2023-02-14 PROCEDURE — 97110 THERAPEUTIC EXERCISES: CPT

## 2023-02-14 PROCEDURE — 97530 THERAPEUTIC ACTIVITIES: CPT

## 2023-02-14 NOTE — PROGRESS NOTES
[] [address] [facility] &  Therapy  [address]  [phone]  F: [phone] [] [address]  [address]   [address]  P: [phone]  F: [phone] [] [address] &  Therapy  1500 [facility]  P: [phone]  F: [phone] [x] [address]  P: [phone]  F: [phone] [] [address]  [facility]   [address]   [place]: [phone]  F: [phone]      Physical Therapy Progress Note    Date: 2023      Patient: Constantine Camarena  : 2000  MRN: [id number]    Physician: Bradley Lobo [Podiatrist]                        Insurance: Lucrezia Deep- 60 visits- combined- HARD MAX  Medical Diagnosis: M25.579 (ICD-10-CM) - Pain in ankle                Rehab Codes: M25.579 (ICD-10-CM) - Pain in ankle; R26.2; M62.81; M76.70  Onset date: 2022; see below                 Next Dr's appt. : 23- tentatively   Visit# / total visits:   Cancels/No Shows: 0/0    Subjective:    Pain:  [x] Yes  [] No               Location: L > R ankle              Pain Rating: (0-10 scale) 2/10 currently (5/10 at worst)  Pain altered Tx:  [x] No  [] Yes  Action:  Comments:     \"Random- comes at random times. \"   Denies specific pattern- nor time of day, aggravating factors. \"Kind of having it shift right again- kind of like when I started running- not sure if it's from the exercises or not. \"   Reports \"pushing through the pain\" in order to hunt, and then experiences pain immediately following hunting. No change with PT services thus far. Reports worse symptoms post each PT visit- near limping with ambulation. No relief with utilizing vaso at end of each treatment session. Plans to visit referring MD next Wednesday.      Assessment:  Pt presents with no change in pain or symptoms since the start of PT services. Therefore, performed goal update and re-assessed LEFS for PN to referring MD- results as detailed above and below- does not meet any therapy goal. Demos slightly improved L foot/ankle AROM- but unable to indicate change in pain. Pt also continues to report that symptoms are random. Denies specific pattern- nor time of day, aggravating factors. Reports continued hunting with inc pain and symptoms. Difficult to treat- unclear direction for PT services. Educated pt as follows: no sig change with PT services thus far; rec follow-up with referring MD- may benefit from further diagnostics/imaging; may benefit from foot assessment for special footwear; PT services placed on hold until this visit; may continue original HEP- but do not continue eccentric calf strengthening- verbalized understanding to all. STG: (to be met in 6 treatments)  ? Pain: Patient will report < 5/10 pain with active movement [ambulation] in order to improve QOL.- UNCLEAR- 0-6 or 7/10 over this past week   ? ROM: Will demo L ankle AROM- all- to near match R in order to improve functional mobility. Will also deny P with AROM L ankle eversion in order to indicate dec inflammation within peroneals. - UNCLEAR- 10 deg EV, 15 deg INV- unable to indicate change in P; 10/5 deg DF  ? Strength: Will demo 4+/5 L foot/ankle gross strength without P- especially resisted EV. Will demo x20 SL HR of L LE with < 5/10 P for improved stair negotiation.- NOT MET- reports inc pain with inc stair negotiation- worse with ascending; MET 4+/5 L foot/ankle gross strength- but unable to indicate P  ? Function: Will ambulate x20 minutes with < 3/10 P for improved QOL.- UNCLEAR  Independent with Home Exercise Programs- MET  LTG: (to be met in 12 treatments)  Will run x1 mile with < 3/10 P for improved tolerance for routine fitness.    Improve LEFS to < 24% impaired or limited- NOT MET- 29% Patient goals: \"to be able to run with no pain and not have this come back; also hurts at random times- so hopefully this helps. \"- NOT MET    Treatment Plan:  [x] Therapeutic Exercise   67847  [] Iontophoresis: 4 mg/mL Dexamethasone Sodium Phosphate  mAmin  25223   [x] Therapeutic Activity  39784 [x] Vasopneumatic cold with compression  38884    [] Gait Training   54841 [] Ultrasound   98110   [] Neuromuscular Re-education  20210 [] Electrical Stimulation Unattended  25214   [x] Manual Therapy  44275 [] Electrical Stimulation Attended  98934   [x] Instruction in HEP  [] Lumbar/Cervical Traction  60912   [] Aquatic Therapy   27532 [x] Cold/hotpack    [] Massage   41581      [] Dry Needling, 1 or 2 muscles  64321   [] Biofeedback, first 15 minutes   19830  [] Biofeedback, additional 15 minutes   18855 [] Dry Needling, 3 or more muscles  31580     Patient Status:     [] Continue per initial plan of care. [] Additional visits necessary. [x] Other: HOLD ON PT SERVICES. Requested Frequency/Duration: SEE ABOVE. Electronically signed by Cristal Guidry PT on 2/14/2023 at 3:21 PM    If you have any questions or concerns, please don't hesitate to call.   Thank you for your referral.

## 2023-02-14 NOTE — FLOWSHEET NOTE
[] [address]  - [facility] TWELVESTEP [facility] &  Therapy  [address]  [phone]  F: [phone] [] [address]  [facility] [address]   [address]  P: [phone]  F: [phone] [] [place] &  Therapy  [address] [facility]  P: [phone]  F: [phone] [x] [address]  P: [phone]  F: [phone] [] [address]  [facility]   [address]   [place]: [phone]  F: [phone]      Physical Therapy Daily Treatment Note    Date:  2023  Patient Name:  Chuy Justice    :  2000  MRN: [id number]  Physician: Nessa Trejo [Podiatrist]                        Insurance: [employer] [facility]- 60 visits- combined- HARD MAX  Medical Diagnosis: M25.579 (ICD-10-CM) - Pain in ankle                Rehab Codes: M25.579 (ICD-10-CM) - Pain in ankle; R26.2; M62.81; M76.70  Onset date: 2022; see below                 Next Dr's appt. : 23- tentatively   Visit# / total visits:   Cancels/No Shows: 0/0    Subjective:    Pain:  [x] Yes  [] No  Location: L > R ankle  Pain Rating: (0-10 scale) 2/10 currently (5/10 at worst)  Pain altered Tx:  [x] No  [] Yes  Action:  Comments:    \"Random- comes at random times. \"   Denies specific pattern- nor time of day, aggravating factors. \"Kind of having it shift right again- kind of like when I started running- not sure if it's from the exercises or not. \"   Reports \"pushing through the pain\" in order to hunt, and then experiences pain immediately following hunting. No change with PT services thus far. Reports worse symptoms post each PT visit- near limping with ambulation. No relief with utilizing vaso at end of each treatment session. Plans to visit referring MD next Wednesday.      Objective:  Modalities: Vaso to B ankles: 34 deg, MED for x10' post- HELD 2/14/23  Precautions: potential L TCJ hypomobility with peroneal tendinitis; concurrent L wrist pain [to be assessed]; desires to run, play basketball without pain   Exercises:  Exercise Reps/ Time Weight/ Level Comments COMPLETED   Goal update, LEFS, HEP/PT POC education    COMPLETED- 2/14/23  No sig change with PT services thus far; rec follow-up with referring MD- may benefit from further diagnostics/imaging; may benefit from foot assessment for special footwear; PT services placed on hold until this visit; may continue original HEP- but do not continue eccentric calf strengthening- verbalized understanding to all  x   Upright bike  x7' Seat 6  x          Resisted L ankle EV  2x10 ea  NEW- 1/26/23- denies inc P  -   D1/D2 PNF Pattern- B 2x10 ea   Alternate ankles  -          Seated self-DF mob- upon slide board  x10-15 ea  Immediately post MT- denies change in symptoms  -          Gastroc wedge str  3x30\" ea L4  x   Soleus wedge str  3x30\" ea L4  x   SLS eyes closed- B 2x30\" ea   Hands on hips; cues for inc big toe activation  -   3 way reach- B 2x10 ea    Modified to performing hip 3-way from HEP no UE A- 1/26/23 -   Eccentric heel raise  10x ea     -   TG DL heel raises/ SL 2x10 ea  L 18   -   Heel raises off stairs DL 15x   -   Other: Primary PT to assess wrist pain at next visit or PRN. Manual: HELD ALL 2/14/23  B TCJ AP glides in supine- gr. III-IV  B TCJ LAD in supine- gr. III-IV  Calcaneal tilt- ADD    May progress to Mulligan technique in standing in the future. See below for goal update. LEFS is 29% impaired or limited      Specific Instructions for next treatment: PT services placed on hold pending follow-up with referring MD secondary to no sig changes with PT services thus far. Follow-up with pt at next appointment regarding this visit- adjust PT POC based upon referring MD instructions.      Treatment Charges: Mins Units   []  Modalities     [x]  Ther Exercise 10 1   []  Manual Therapy     [x]  Ther Activities 18 1   []  Aquatics     []  Vasocompression     []  Other     Total Treatment time 28 2     [x7' on upright bike]    Assessment: [] Progressing toward goals. [x] No change. Pt presents with no change in pain or symptoms since the start of PT services. Therefore, performed goal update and re-assessed LEFS for PN to referring MD- results as detailed above and below- does not meet any therapy goal. Demos slightly improved L foot/ankle AROM- but unable to indicate change in pain. Pt also continues to report that symptoms are random. Denies specific pattern- nor time of day, aggravating factors. Reports continued hunting with inc pain and symptoms. Difficult to treat- unclear direction for PT services. Educated pt as follows: no sig change with PT services thus far; rec follow-up with referring MD- may benefit from further diagnostics/imaging; may benefit from foot assessment for special footwear; PT services placed on hold until this visit; may continue original HEP- but do not continue eccentric calf strengthening- verbalized understanding to all.      [] Other:  [x] Patient would continue to benefit from skilled physical therapy services in order to: inc L foot/ankle A/PROM; L foot/ankle gross strength; and overall improve tolerance for ambulation, stair negotiation, running and basketball. STG: (to be met in 6 treatments)  ? Pain: Patient will report < 5/10 pain with active movement [ambulation] in order to improve QOL.- UNCLEAR- 0-6 or 7/10 over this past week   ? ROM: Will demo L ankle AROM- all- to near match R in order to improve functional mobility. Will also deny P with AROM L ankle eversion in order to indicate dec inflammation within peroneals. - UNCLEAR- 10 deg EV, 15 deg INV- unable to indicate change in P; 10/5 deg DF  ? Strength: Will demo 4+/5 L foot/ankle gross strength without P- especially resisted EV.  Will demo x20 SL HR of L LE with < 5/10 P for improved stair negotiation.- NOT MET- reports inc pain with inc stair negotiation- worse with ascending; MET 4+/5 L foot/ankle gross strength- but unable to indicate P  ? Function: Will ambulate x20 minutes with < 3/10 P for improved QOL.- UNCLEAR  Independent with Home Exercise Programs- MET  LTG: (to be met in 12 treatments)  Will run x1 mile with < 3/10 P for improved tolerance for routine fitness. Improve LEFS to < 24% impaired or limited- NOT MET- 29%                 Patient goals: \"to be able to run with no pain and not have this come back; also hurts at random times- so hopefully this helps. \"- NOT MET    Pt. Education:  [x] Yes  [] No  [] Reviewed Prior HEP/Ed  Method of Education: [x] Verbal  [] Demo  [] Written  Comprehension of Education:  [x] Verbalizes understanding. [] Demonstrates understanding. [] Needs review. [] Demonstrates/verbalizes HEP/Ed previously given. Access Code: [email or web address]  URL: [email or web address]  Date: 01/24/2023  Prepared by: Prince Morris    Exercises  Isometric Ankle Eversion at [address] - 2 x daily - 7 x weekly - 2 sets - 10 reps - 5 sec hold  Long Sitting Calf Stretch with Strap - 2 x daily - 7 x weekly - 1 sets - 3 reps - 30 sec hold  Seated Ankle Dorsiflexion Stretch - 2 x daily - 7 x weekly - 2 sets - 10 reps  Gastroc Stretch on Wall - 2 x daily - 7 x weekly - 1 sets - 3 reps - 30 sec hold  Soleus Stretch on Wall - 2 x daily - 7 x weekly - 1 sets - 3 reps - 30 sec hold  Seated Great Toe Extension - 2 x daily - 7 x weekly - 2 sets - 10 reps  Seated Lesser Toes Extension - 2 x daily - 7 x weekly - 2 sets - 10 reps  Arch Lifting - 2 x daily - 7 x weekly - 2 sets - 10 reps  Standing 3-Way Kick - 1 x daily - 7 x weekly - 2 sets - 10 reps  Single Leg Balance with Eyes Closed - 1 x daily - 7 x weekly - 1 sets - 3 reps - 30 sec hold    2/14/23- See grid above for details. Plan: [x] Continue current frequency toward long and short term goals.     [x] Specific Instructions for subsequent treatments: PT services placed on hold pending follow-up with referring MD secondary to no sig changes with PT services thus far. Follow-up with pt at next appointment regarding this visit- adjust PT POC based upon referring MD instructions.      Frequency: 2 x/week for 12 visits     Time In: 2:35PM     Time Out: 3:10PM    Electronically signed by:  Jaclyn Fisher PT

## 2023-02-16 ENCOUNTER — APPOINTMENT (OUTPATIENT)
Dept: PHYSICAL THERAPY | Facility: CLINIC | Age: 23
End: 2023-02-16
Payer: COMMERCIAL

## 2023-02-21 ENCOUNTER — APPOINTMENT (OUTPATIENT)
Dept: PHYSICAL THERAPY | Facility: CLINIC | Age: 23
End: 2023-02-21
Payer: COMMERCIAL

## 2023-02-23 ENCOUNTER — APPOINTMENT (OUTPATIENT)
Dept: PHYSICAL THERAPY | Facility: CLINIC | Age: 23
End: 2023-02-23
Payer: COMMERCIAL

## 2023-10-23 ENCOUNTER — APPOINTMENT (OUTPATIENT)
Dept: GENERAL RADIOLOGY | Age: 23
End: 2023-10-23
Attending: STUDENT IN AN ORGANIZED HEALTH CARE EDUCATION/TRAINING PROGRAM
Payer: COMMERCIAL

## 2023-10-23 ENCOUNTER — ANESTHESIA EVENT (OUTPATIENT)
Dept: OPERATING ROOM | Age: 23
End: 2023-10-23
Payer: COMMERCIAL

## 2023-10-23 ENCOUNTER — ANESTHESIA (OUTPATIENT)
Dept: OPERATING ROOM | Age: 23
End: 2023-10-23
Payer: COMMERCIAL

## 2023-10-23 ENCOUNTER — HOSPITAL ENCOUNTER (OUTPATIENT)
Age: 23
Setting detail: OUTPATIENT SURGERY
Discharge: HOME | End: 2023-10-23
Attending: STUDENT IN AN ORGANIZED HEALTH CARE EDUCATION/TRAINING PROGRAM | Admitting: STUDENT IN AN ORGANIZED HEALTH CARE EDUCATION/TRAINING PROGRAM
Payer: COMMERCIAL

## 2023-10-23 VITALS
OXYGEN SATURATION: 99 % | HEIGHT: 73 IN | BODY MASS INDEX: 22.93 KG/M2 | SYSTOLIC BLOOD PRESSURE: 130 MMHG | DIASTOLIC BLOOD PRESSURE: 73 MMHG | WEIGHT: 173 LBS | RESPIRATION RATE: 23 BRPM | HEART RATE: 91 BPM | TEMPERATURE: 97.2 F

## 2023-10-23 DIAGNOSIS — Q66.89 TARSAL COALITIONS: ICD-10-CM

## 2023-10-23 DIAGNOSIS — G89.18 POST-OP PAIN: Primary | ICD-10-CM

## 2023-10-23 PROCEDURE — 2580000003 HC RX 258: Performed by: ANESTHESIOLOGY

## 2023-10-23 PROCEDURE — 6360000002 HC RX W HCPCS: Performed by: ANESTHESIOLOGY

## 2023-10-23 PROCEDURE — 2500000003 HC RX 250 WO HCPCS: Performed by: NURSE ANESTHETIST, CERTIFIED REGISTERED

## 2023-10-23 PROCEDURE — 64445 NJX AA&/STRD SCIATIC NRV IMG: CPT | Performed by: ANESTHESIOLOGY

## 2023-10-23 PROCEDURE — 3600000002 HC SURGERY LEVEL 2 BASE: Performed by: STUDENT IN AN ORGANIZED HEALTH CARE EDUCATION/TRAINING PROGRAM

## 2023-10-23 PROCEDURE — 3700000001 HC ADD 15 MINUTES (ANESTHESIA): Performed by: STUDENT IN AN ORGANIZED HEALTH CARE EDUCATION/TRAINING PROGRAM

## 2023-10-23 PROCEDURE — 7100000000 HC PACU RECOVERY - FIRST 15 MIN: Performed by: STUDENT IN AN ORGANIZED HEALTH CARE EDUCATION/TRAINING PROGRAM

## 2023-10-23 PROCEDURE — 7100000001 HC PACU RECOVERY - ADDTL 15 MIN: Performed by: STUDENT IN AN ORGANIZED HEALTH CARE EDUCATION/TRAINING PROGRAM

## 2023-10-23 PROCEDURE — 6360000002 HC RX W HCPCS: Performed by: NURSE ANESTHETIST, CERTIFIED REGISTERED

## 2023-10-23 PROCEDURE — 7100000011 HC PHASE II RECOVERY - ADDTL 15 MIN: Performed by: STUDENT IN AN ORGANIZED HEALTH CARE EDUCATION/TRAINING PROGRAM

## 2023-10-23 PROCEDURE — 3600000012 HC SURGERY LEVEL 2 ADDTL 15MIN: Performed by: STUDENT IN AN ORGANIZED HEALTH CARE EDUCATION/TRAINING PROGRAM

## 2023-10-23 PROCEDURE — 3700000000 HC ANESTHESIA ATTENDED CARE: Performed by: STUDENT IN AN ORGANIZED HEALTH CARE EDUCATION/TRAINING PROGRAM

## 2023-10-23 PROCEDURE — 2580000003 HC RX 258: Performed by: NURSE ANESTHETIST, CERTIFIED REGISTERED

## 2023-10-23 PROCEDURE — 7100000010 HC PHASE II RECOVERY - FIRST 15 MIN: Performed by: STUDENT IN AN ORGANIZED HEALTH CARE EDUCATION/TRAINING PROGRAM

## 2023-10-23 PROCEDURE — 88311 DECALCIFY TISSUE: CPT

## 2023-10-23 PROCEDURE — 88305 TISSUE EXAM BY PATHOLOGIST: CPT

## 2023-10-23 PROCEDURE — 2580000003 HC RX 258: Performed by: STUDENT IN AN ORGANIZED HEALTH CARE EDUCATION/TRAINING PROGRAM

## 2023-10-23 PROCEDURE — 2709999900 HC NON-CHARGEABLE SUPPLY: Performed by: STUDENT IN AN ORGANIZED HEALTH CARE EDUCATION/TRAINING PROGRAM

## 2023-10-23 PROCEDURE — 73630 X-RAY EXAM OF FOOT: CPT

## 2023-10-23 DEVICE — IMPLANTABLE DEVICE: Type: IMPLANTABLE DEVICE | Status: FUNCTIONAL

## 2023-10-23 RX ORDER — LIDOCAINE HYDROCHLORIDE 10 MG/ML
1 INJECTION, SOLUTION EPIDURAL; INFILTRATION; INTRACAUDAL; PERINEURAL
Status: DISCONTINUED | OUTPATIENT
Start: 2023-10-24 | End: 2023-10-23 | Stop reason: HOSPADM

## 2023-10-23 RX ORDER — CEFAZOLIN SODIUM 1 G/3ML
INJECTION, POWDER, FOR SOLUTION INTRAMUSCULAR; INTRAVENOUS PRN
Status: DISCONTINUED | OUTPATIENT
Start: 2023-10-23 | End: 2023-10-23 | Stop reason: SDUPTHER

## 2023-10-23 RX ORDER — SODIUM CHLORIDE 9 MG/ML
INJECTION, SOLUTION INTRAVENOUS CONTINUOUS
Status: DISCONTINUED | OUTPATIENT
Start: 2023-10-23 | End: 2023-10-23 | Stop reason: HOSPADM

## 2023-10-23 RX ORDER — FENTANYL CITRATE 50 UG/ML
25 INJECTION, SOLUTION INTRAMUSCULAR; INTRAVENOUS EVERY 5 MIN PRN
Status: DISCONTINUED | OUTPATIENT
Start: 2023-10-23 | End: 2023-10-23 | Stop reason: HOSPADM

## 2023-10-23 RX ORDER — PROPOFOL 10 MG/ML
INJECTION, EMULSION INTRAVENOUS PRN
Status: DISCONTINUED | OUTPATIENT
Start: 2023-10-23 | End: 2023-10-23 | Stop reason: SDUPTHER

## 2023-10-23 RX ORDER — LIDOCAINE HYDROCHLORIDE 20 MG/ML
INJECTION, SOLUTION EPIDURAL; INFILTRATION; INTRACAUDAL; PERINEURAL PRN
Status: DISCONTINUED | OUTPATIENT
Start: 2023-10-23 | End: 2023-10-23 | Stop reason: SDUPTHER

## 2023-10-23 RX ORDER — KETOROLAC TROMETHAMINE 10 MG/1
10 TABLET, FILM COATED ORAL EVERY 6 HOURS PRN
Qty: 20 TABLET | Refills: 0 | Status: SHIPPED | OUTPATIENT
Start: 2023-10-23 | End: 2023-10-28

## 2023-10-23 RX ORDER — ROPIVACAINE HYDROCHLORIDE 5 MG/ML
INJECTION, SOLUTION EPIDURAL; INFILTRATION; PERINEURAL
Status: COMPLETED | OUTPATIENT
Start: 2023-10-23 | End: 2023-10-23

## 2023-10-23 RX ORDER — FENTANYL CITRATE 50 UG/ML
INJECTION, SOLUTION INTRAMUSCULAR; INTRAVENOUS PRN
Status: DISCONTINUED | OUTPATIENT
Start: 2023-10-23 | End: 2023-10-23 | Stop reason: SDUPTHER

## 2023-10-23 RX ORDER — ONDANSETRON 2 MG/ML
4 INJECTION INTRAMUSCULAR; INTRAVENOUS
Status: DISCONTINUED | OUTPATIENT
Start: 2023-10-23 | End: 2023-10-23 | Stop reason: HOSPADM

## 2023-10-23 RX ORDER — SODIUM CHLORIDE 9 MG/ML
INJECTION, SOLUTION INTRAVENOUS PRN
Status: DISCONTINUED | OUTPATIENT
Start: 2023-10-23 | End: 2023-10-23 | Stop reason: HOSPADM

## 2023-10-23 RX ORDER — SODIUM CHLORIDE 0.9 % (FLUSH) 0.9 %
5-40 SYRINGE (ML) INJECTION PRN
Status: DISCONTINUED | OUTPATIENT
Start: 2023-10-23 | End: 2023-10-23 | Stop reason: HOSPADM

## 2023-10-23 RX ORDER — ONDANSETRON 2 MG/ML
INJECTION INTRAMUSCULAR; INTRAVENOUS PRN
Status: DISCONTINUED | OUTPATIENT
Start: 2023-10-23 | End: 2023-10-23 | Stop reason: SDUPTHER

## 2023-10-23 RX ORDER — MIDAZOLAM HYDROCHLORIDE 1 MG/ML
2 INJECTION INTRAMUSCULAR; INTRAVENOUS ONCE
Status: COMPLETED | OUTPATIENT
Start: 2023-10-23 | End: 2023-10-23

## 2023-10-23 RX ORDER — MAGNESIUM HYDROXIDE 1200 MG/15ML
LIQUID ORAL CONTINUOUS PRN
Status: COMPLETED | OUTPATIENT
Start: 2023-10-23 | End: 2023-10-23

## 2023-10-23 RX ORDER — SODIUM CHLORIDE 0.9 % (FLUSH) 0.9 %
5-40 SYRINGE (ML) INJECTION EVERY 12 HOURS SCHEDULED
Status: DISCONTINUED | OUTPATIENT
Start: 2023-10-23 | End: 2023-10-23 | Stop reason: HOSPADM

## 2023-10-23 RX ORDER — TRAMADOL HYDROCHLORIDE 50 MG/1
50 TABLET ORAL EVERY 4 HOURS PRN
Qty: 30 TABLET | Refills: 0 | Status: SHIPPED | OUTPATIENT
Start: 2023-10-23 | End: 2023-10-28

## 2023-10-23 RX ORDER — SODIUM CHLORIDE, SODIUM LACTATE, POTASSIUM CHLORIDE, CALCIUM CHLORIDE 600; 310; 30; 20 MG/100ML; MG/100ML; MG/100ML; MG/100ML
INJECTION, SOLUTION INTRAVENOUS CONTINUOUS
Status: DISCONTINUED | OUTPATIENT
Start: 2023-10-23 | End: 2023-10-23 | Stop reason: HOSPADM

## 2023-10-23 RX ORDER — DEXAMETHASONE SODIUM PHOSPHATE 10 MG/ML
INJECTION, SOLUTION INTRAMUSCULAR; INTRAVENOUS PRN
Status: DISCONTINUED | OUTPATIENT
Start: 2023-10-23 | End: 2023-10-23 | Stop reason: SDUPTHER

## 2023-10-23 RX ORDER — FENTANYL CITRATE 50 UG/ML
50 INJECTION, SOLUTION INTRAMUSCULAR; INTRAVENOUS EVERY 10 MIN PRN
Status: DISCONTINUED | OUTPATIENT
Start: 2023-10-23 | End: 2023-10-23 | Stop reason: HOSPADM

## 2023-10-23 RX ORDER — OXYCODONE HYDROCHLORIDE 5 MG/1
5 TABLET ORAL
Status: DISCONTINUED | OUTPATIENT
Start: 2023-10-23 | End: 2023-10-23 | Stop reason: HOSPADM

## 2023-10-23 RX ORDER — SODIUM CHLORIDE, SODIUM LACTATE, POTASSIUM CHLORIDE, CALCIUM CHLORIDE 600; 310; 30; 20 MG/100ML; MG/100ML; MG/100ML; MG/100ML
INJECTION, SOLUTION INTRAVENOUS CONTINUOUS PRN
Status: DISCONTINUED | OUTPATIENT
Start: 2023-10-23 | End: 2023-10-23 | Stop reason: SDUPTHER

## 2023-10-23 RX ADMIN — SODIUM CHLORIDE, POTASSIUM CHLORIDE, SODIUM LACTATE AND CALCIUM CHLORIDE: 600; 310; 30; 20 INJECTION, SOLUTION INTRAVENOUS at 12:14

## 2023-10-23 RX ADMIN — PROPOFOL 200 MG: 10 INJECTION, EMULSION INTRAVENOUS at 13:16

## 2023-10-23 RX ADMIN — FENTANYL CITRATE 100 MCG: 50 INJECTION INTRAMUSCULAR; INTRAVENOUS at 13:16

## 2023-10-23 RX ADMIN — DEXAMETHASONE SODIUM PHOSPHATE 10 MG: 10 INJECTION, SOLUTION INTRAMUSCULAR; INTRAVENOUS at 13:21

## 2023-10-23 RX ADMIN — MIDAZOLAM 2 MG: 1 INJECTION INTRAMUSCULAR; INTRAVENOUS at 12:47

## 2023-10-23 RX ADMIN — LIDOCAINE HYDROCHLORIDE 80 MG: 20 INJECTION, SOLUTION EPIDURAL; INFILTRATION; INTRACAUDAL; PERINEURAL at 13:16

## 2023-10-23 RX ADMIN — CEFAZOLIN 2 G: 1 INJECTION, POWDER, FOR SOLUTION INTRAMUSCULAR; INTRAVENOUS at 13:25

## 2023-10-23 RX ADMIN — ONDANSETRON 4 MG: 2 INJECTION INTRAMUSCULAR; INTRAVENOUS at 15:01

## 2023-10-23 RX ADMIN — ROPIVACAINE HYDROCHLORIDE 30 ML: 5 INJECTION EPIDURAL; INFILTRATION; PERINEURAL at 12:40

## 2023-10-23 RX ADMIN — SODIUM CHLORIDE, POTASSIUM CHLORIDE, SODIUM LACTATE AND CALCIUM CHLORIDE: 600; 310; 30; 20 INJECTION, SOLUTION INTRAVENOUS at 13:12

## 2023-10-23 ASSESSMENT — PAIN - FUNCTIONAL ASSESSMENT
PAIN_FUNCTIONAL_ASSESSMENT: 0-10
PAIN_FUNCTIONAL_ASSESSMENT: PREVENTS OR INTERFERES SOME ACTIVE ACTIVITIES AND ADLS

## 2023-10-23 ASSESSMENT — PAIN DESCRIPTION - DESCRIPTORS: DESCRIPTORS: ACHING

## 2023-10-23 ASSESSMENT — ENCOUNTER SYMPTOMS: SHORTNESS OF BREATH: 0

## 2023-10-23 NOTE — DISCHARGE INSTRUCTIONS
Foot and Ankle Surgery Post Operative Instructions: You have had a surgical procedure on your left foot. Fluids and Diet:  Begin with clear liquids, broth, dry toast, and crackers. If not nauseated then resume your regular pre-operative diet when you are ready  With your history of diabetes, please lower your intake sugar content food as this will negatively impact surgery. Medications: Take your prescriptions as directed  You are receiving new prescriptions for pain  You received popliteal block (nerve block in the back of knee)- this should manage your pain for the first 18hrs post operatively  If your pain is not severe then you may take the non-prescription medication that you normally take for aches and pains ie Tylenol and Ibuprofen (alternating), or if severe pain occurs these will serve as additional medication in conjuction with the Toradol and tramadol. Please alternate the Toradol and Tramadol so that you are taking them 3 hours apart from each other (EX. Tramadol then 3 hours later Toradol then 3 hours later Toradol and so forth)    You may resume your regularly scheduled medications (unless otherwise directed)  If any side effects or adverse reactions occur, discontinue the medication and contact your doctor. Review the patient drug information that is provided before you take any medication    Ambulation and Activity:  You are advised to go directly home from the hospital  Use crutches or scooter as needed  We recommend knee scooter if possible, you can also obtain these on [address] [place] [address] for rather affordable and quickly obtainable. You may not put weight on the operated foot. You should wear the surgical shoe at all times when awake. Do not walk on the left foot. Avoid stairs.   Do not lift or move heavy objects  Do not drive until cleared by your physician    Bandage and Wound Care Instructions:  Keep bandage clean and dry  Do NOT remove dressing/ splint  DO NOT get wet  Please use shower cover around leg if you do shower so that dressing does not get wet  Do not shower or bathe the operative extremity  Do not remove the bandage (unless otherwise directed)   Do not attempt to put anything between the cast or dressing and your skin, some itching is normal.    Ice and Elevation:   Elevate operative extremity as much as possible to reduce swelling and discomfort. Elevate with 2 pillows at or above the level of the heart for the first 72 hours. This is important for post operative swelling and pain  Ice:  [address] dispensed insulated ice bag over the bandage 20 minutes of every hour while awake for the first 72 hours. You may ice behind the knee as well. Special Instructions: Call your doctor immediately if you develop any of the following. Fever over 100.4 degrees Fahrenheit by mouth - take your temperature daily until your first follow up visit. Pain not relieved by medication ordered  Swelling, increased redness, warmth, or hardness around operative area. Numb, tingling or cold toes. Toe(s) become white or bluish  Bandage becomes wet, soiled, or blood soaked (small amount of bleeding may be normal)  Increased or progressive drainage from surgical area. Follow up instructions: You will need to follow up with Dr. Key Mtz DPM within 7-10 days post operatively  Call when you get home to schedule or confirm your appointment. Call your Podiatrist office if you have any questions or concerns.

## 2023-10-23 NOTE — ANESTHESIA PROCEDURE NOTES
Peripheral Block    Patient location during procedure: pre-op  Reason for block: post-op pain management and at surgeon's request  Start time: 10/23/2023 12:40 PM  End time: 10/23/2023 12:43 PM  Staffing  Performed: anesthesiologist   Anesthesiologist: Val Miranda MD  Performed by: Val Miranda MD  Authorized by: Val Miranda MD    Preanesthetic Checklist  Completed: patient identified, IV checked, site marked, risks and benefits discussed, surgical/procedural consents, equipment checked, pre-op evaluation, timeout performed, anesthesia consent given, oxygen available, monitors applied/VS acknowledged, fire risk safety assessment completed and verbalized and blood product R/B/A discussed and consented  Peripheral Block   Patient position: supine  Prep: ChloraPrep  Provider prep: mask and sterile gloves  Patient monitoring: cardiac monitor, continuous pulse ox, frequent blood pressure checks and IV access  Block type: Sciatic  Popliteal  Laterality: left  Injection technique: single-shot  Guidance: ultrasound guided  Local infiltration: lidocaine  Infiltration strength: 1 %  Local infiltration: lidocaine  Dose: 3 mL    Needle   Needle type: insulated echogenic nerve stimulator needle   Needle gauge: 20 G  Needle localization: ultrasound guidance  Needle length: 10 cm  Assessment   Injection assessment: negative aspiration for heme, no paresthesia on injection, local visualized surrounding nerve on ultrasound and no intravascular symptoms  Paresthesia pain: none  Slow fractionated injection: yes  Hemodynamics: stable  Real-time US image taken/store: yes  Outcomes: patient tolerated procedure well and uncomplicated    Additional Notes  Time out performed.          Medications Administered  ropivacaine (NAROPIN) injection 0.5% - Perineural   30 mL - 10/23/2023 12:40:00 PM

## 2023-10-23 NOTE — PROGRESS NOTES
Patient prepared for PNB. Patient connected to all monitoring equipment. Patient placed on nasal cannula at 2 liters. Time Out completed in the presence of 2 RNs and  at 1245 pm. Versed 2 mg IV administered IV at 1247. Procedure start time 1249. Procedure End Time 5211. Patient tolerated procedure without difficulty. Bed placed in lowest locked position. SR up x2. Call light within reach. Mom at bedside. Patient remains connected to all monitoring equipment.

## 2023-10-23 NOTE — OP NOTE
OP NOTE    PATIENT NAME: Jordan Gaytan  YOB: 2000  -  26 y.o. 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 PROCEDURE:   Excision of tarsal coalition, left foot (CPT 05272)    ANESTHESIA: General, regional popliteal block to left lower extremity    HEMOSTASIS: Pneumatic thigh tourniquet @300 mmHg for 71 minutes. ESTIMATED BLOOD LOSS: Roughly 5 cc. MATERIALS:   Implant Name Type Inv. Item Serial No.  Lot No. LRB No. 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 Please see op note for full detail. INDICATIONS FOR PROCEDURE: The patient is a 81-year-old male who has had worsening pain to the left foot over the past 1 to 2 years. The patient was initially seen by my colleague Dr. Jordan Reed who ordered an MRI of the left ankle identifying suspected fibrous coalition of the middle facet of the subtalar joint. Patient was referred to our office for surgical excision of the symptomatic coalition to the left foot. Pain has progressed to the point of limiting ambulation, difficulty in shoe gear, and negatively affecting daily activity. Patient has failed numerous conservative treatments including anti-inflammatory medication, offloading padding and inserts, shoe gear modifications, physical therapy, lifestyle modifications, and corticosteroid injection.  We once again discussed surgical intervention including excision of tarsal coalition of the left foot including all the risks, benefits, for exposure to the medial subtalar joint and the tarsal coalition. The coalition was noted to be fibrous in nature. The coalition was then excised utilizing combination of of osteotome and rongeur. The coalition was excised until the posterior facet was visualized. Intermittent retractor was inserted to allow for distraction of the subtalar joint and ensure complete resection of the coalition. Subtalar joint was taken through range of motion which was noted to be improved from preoperative state. Intraoperative fluoroscopy was also utilized to confirm complete resection and joint resection. Some of the excised bone was sent to pathology. The surgical site was then irrigated with copious amounts of sterile saline. Arthrex arthroflex graft was then used as a interpositional graft in hopes of preventing recurrence of the coalition and allow for fibrocartilage formation. Bone wax was not utilized due to patient's allergies. The surgical site was then closed in layers utilizing 3-0 Monocryl for deep closure, 3 Monocryl for subcutaneous closure, 3-0 Prolene for skin closure. Dressings were applied consisting of Adaptic, gauze 4 x 4's, ABD, Kerlix, Ace. A well-padded short leg posterior splint was then applied in the neutral dorsiflexed position. The patient tolerated above procedure and anesthesia well without complication. He was transported to PACU with vital signs stable and neurovascular status intact to left foot. Postoperative plan: Patient will be discharged on multimodal pain medication per postoperative protocol. The patient is to be nonweightbearing to the left lower extremity. He will maintain the posterior splint for approximately 2 weeks.   He will follow-up my office within 2 weeks for anticipated suture removal.      Milana Georges DPM on 10/23/2023 at 3:33 PM  The [address] [place] [address] Surgery  Associate, [employer]    This note was

## 2023-10-23 NOTE — ANESTHESIA PRE PROCEDURE
results found for: \"WBC\", \"RBC\", \"HGB\", \"HCT\", \"MCV\", \"RDW\", \"PLT\"    CMP: No results found for: \"NA\", \"K\", \"CL\", \"CO2\", \"BUN\", \"CREATININE\", \"GFRAA\", \"AGRATIO\", \"LABGLOM\", \"GLUCOSE\", \"GLU\", \"PROT\", \"CALCIUM\", \"BILITOT\", \"ALKPHOS\", \"AST\", \"ALT\"    POC Tests: No results for input(s): \"POCGLU\", \"POCNA\", \"POCK\", \"POCCL\", \"POCBUN\", \"POCHEMO\", \"POCHCT\" in the last 72 hours. Coags: No results found for: \"PROTIME\", \"INR\", \"APTT\"    HCG (If Applicable): No results found for: \"PREGTESTUR\", \"PREGSERUM\", \"HCG\", \"HCGQUANT\"     ABGs: No results found for: \"PHART\", \"PO2ART\", \"DYG4SLU\", \"JRD8RRG\", \"BEART\", \"L5VBQUAR\"     Type & Screen (If Applicable):  No results found for: \"LABABO\", \"LABRH\"    Drug/Infectious Status (If Applicable):  No results found for: \"HIV\", \"HEPCAB\"    COVID-19 Screening (If Applicable): No results found for: \"COVID19\"        Anesthesia Evaluation    Airway: Mallampati: I  TM distance: >3 FB   Neck ROM: full  Mouth opening: > = 3 FB   Dental:          Pulmonary:   (+) asthma:     (-) shortness of breath                           Cardiovascular:                      Neuro/Psych:               GI/Hepatic/Renal:             Endo/Other:                     Abdominal:             Vascular:           Other Findings:           Anesthesia Plan      general     ASA 2                                   Donna Matias MD   10/23/2023

## 2023-10-23 NOTE — ANESTHESIA POSTPROCEDURE EVALUATION
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

## 2023-10-23 NOTE — BRIEF OP NOTE
PODIATRY BRIEF OP NOTE    PATIENT NAME: Padmini Scherer  YOB: 2000  -  26 y.o. 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 PROCEDURE:   Excision of tarsal coalition, left foot (CPT 28436)    ANESTHESIA: General with local    HEMOSTASIS: Pneumatic thigh tourniquet @ 300 mmHg for 90 minutes. ESTIMATED BLOOD LOSS: Less than 10cc. MATERIALS:   Implant Name Type Inv. Item Serial No.  Lot No. LRB No. Used Action   GRAFT HUM TISS [id number] DECELLULARIZED DERM W/MATRACELL - [id number]  GRAFT HUM TISS [id number] DECELLULARIZED DERM W/MATRACELL  [employer] [id number] Left 1 Implanted       INJECTABLES: Pre-op Popliteal block    SPECIMEN:   ID Type Source Tests Collected by Time Destination   A : LEFT COALITION Tissue Ankle SURGICAL PATHOLOGY Artist [facility], [place]ST[place] 10/23/2023 5923        COMPLICATIONS: none    FINDINGS: Medial incision utilized. Dissection carried out between the posterior tibialis and flexor digitorum longus tendons. Care was taken to retract all vital structures. After dissection performed, adequate visualization of the medial facet tarsal collection was achieved. Utilizing an osteotome, the middle tarsal coalition was resected. A distractor was utilized to adequately open up the middle facet and checked that all of the coalition was resected. After adequate resection was achieved, a graft was placed into the middle facet to prevent reoccurrence of collection. Fluoroscopy was used throughout the procedure to check adequate position and resection. Surgical site was closed in layers using Monocryl and Prolene.     Bonnie George DPM   Podiatric Medicine & Surgery   10/23/2023 at 3:22 PM

## 2023-10-25 LAB — SURGICAL PATHOLOGY REPORT: NORMAL

## 2023-12-06 ENCOUNTER — HOSPITAL ENCOUNTER (OUTPATIENT)
Dept: PHYSICAL THERAPY | Facility: CLINIC | Age: 23
Setting detail: THERAPIES SERIES
Discharge: HOME | End: 2023-12-06
Payer: COMMERCIAL

## 2023-12-06 PROCEDURE — 97016 VASOPNEUMATIC DEVICE THERAPY: CPT

## 2023-12-06 PROCEDURE — 97110 THERAPEUTIC EXERCISES: CPT

## 2023-12-06 PROCEDURE — 97162 PT EVAL MOD COMPLEX 30 MIN: CPT

## 2023-12-06 NOTE — CONSULTS
[] [address]  [address] [facility].  [phone]  F: [phone] [] [address] [facility]  [address]  P: [phone]  F: [phone] [] [address]  151 [facility]  P: [phone]  F: [phone] [x] Dontrell Mcgillie: [phone]  F: [phone] [] 224 [facility]  [address] [facility] [address]   P: [phone]  F: [phone]  [] [address] [facility].   P: [phone]  F: [phone] [] 205 [facility]  [address]  P: [phone]  F: [phone] [] 224 [facility]  [address] [facility]  [place]: [phone]  F: [phone] [] [address]  [place]: [phone]  F: [phone]  [] [address]  [address] [facility] [address]  [place]: [phone]  F: [phone]     Physical Therapy Lower Extremity Evaluation    Date:  2023  Patient: Marianne Verma  : 2000  MRN: [id number]  Physician:     Clyde Oro DPM   Insurance: Ed Calabrese; Osito yr; 62/51vs; no auth req; hard max; PT/OT/ST/Pulm comb; no pt resp-Family OOP met for    Medical Diagnosis: Q66.89 (ICD-10-CM) - Other specified congenital deformities of feet     Rehab Codes: M25.572 - Left ankle pain  Onset date: 10/29/23  Next 's appt.: Pending    Subjective:   CC: Left anterior ankle pain  HPI: The patient had surgery for a tarsal coalition at the left subtalar joint on 10/29/23.  He was NWB for wo weeks and has done

## 2023-12-14 ENCOUNTER — HOSPITAL ENCOUNTER (OUTPATIENT)
Dept: PHYSICAL THERAPY | Facility: CLINIC | Age: 23
Setting detail: THERAPIES SERIES
Discharge: HOME | End: 2023-12-14
Payer: COMMERCIAL

## 2023-12-14 PROCEDURE — 97110 THERAPEUTIC EXERCISES: CPT

## 2023-12-14 PROCEDURE — 97140 MANUAL THERAPY 1/> REGIONS: CPT

## 2023-12-14 NOTE — FLOWSHEET NOTE
[] [address]  4600 [facility].  [phone]  F: [phone] [] 204 [facility]  [address]   [address]  P: [phone]  F: [phone] [] [address]  151 [facility]  P: [phone]  F: [phone] [x] Dontrell Kenya: [phone]  F: [phone] [] 224 [facility]  [address] [facility]   [address]   P: [phone]  F: [phone]  [] [address] [facility].   P: [phone]  F: [phone] [] 205 [facility]  [address] [facility] [address]  P: [phone]  F: [phone] [] [address] [facility]  [address] [facility]  [place]: [phone]  F: [phone] [] [address] [facility] [address]  [place]: [phone]  F: [phone]      Physical Therapy Daily Treatment Note    Date:  2023  Patient Name:  Janet Bonilla    :  2000  MRN: [id number]  Physician: Lashae Mo DPM  Insurance: Alyce Cardoza; Osito yr; 62/51vs; no auth req; hard max; PT/OT/ST/Pulm comb; no pt resp-Family OOP met for    Medical Diagnosis: Q66.89 (ICD-10-CM) - Other specified congenital deformities of feet                   Rehab Codes: M25.572 - Left ankle pain  Onset date: 10/29/23             Next 's appt.: Pending  Visit# / total visits: ; Cancels/No Shows: 0/0    Subjective:    Pain:  [] Yes  [x] No Location: L ankle pain  Pain Rating: (0-10 scale) 0/10  Pain altered Tx:  [x] No  [] Yes  Action:  Comments: Pt reported minimal to no pain in the L ankle.      Objective:  Modalities:

## 2024-01-03 ENCOUNTER — HOSPITAL ENCOUNTER (OUTPATIENT)
Dept: PHYSICAL THERAPY | Facility: CLINIC | Age: 24
Setting detail: THERAPIES SERIES
Discharge: HOME | End: 2024-01-03
Payer: COMMERCIAL

## 2024-01-03 PROCEDURE — 97110 THERAPEUTIC EXERCISES: CPT

## 2024-01-03 PROCEDURE — 97140 MANUAL THERAPY 1/> REGIONS: CPT

## 2024-01-03 NOTE — FLOWSHEET NOTE
[] [facility]  Outpatient Rehabilitation &  Therapy  [address]  [phone]  F: [phone] [] [facility]  Outpatient Rehabilitation &  Therapy  [address]   [address]  P: [phone]  F: [phone] [] [facility]  Outpatient Rehabilitation &  Therapy  [address]  P: [phone]  F: [phone] [x] [facility]  Outpatient Rehabilitation &  Therapy  [address]  P: [phone]  F: [phone] [] [facility]  Outpatient Rehabilitation &  Therapy  [address]   P: [phone]  F: [phone]  [] [facility]  Outpatient Rehabilitation &  Therapy  [address]   P: [phone]  F: [phone] [] [facility]  Outpatient Rehabilitation &  Therapy  [address]  P: [phone]  F: [phone] [] [facility]  Outpatient Rehabilitation &  Therapy  [address] [facility]  [address]  P: [phone]  F: [phone] [] [facility]  Outpatient Rehabilitation &  Therapy  [address]  P: [phone]  F: [phone]      Physical Therapy Daily Treatment Note    Date:  1/3/2024  Patient Name:  Bird Archer    :  2000  MRN: [id number]  Physician: Marvin Chanel DPM  Insurance: BCBS; Osito yr; 60/48vs; no auth req; hard max; PT/OT/ST/Pulm comb; no pt resp-Family OOP met for    Medical Diagnosis: Q66.89 (ICD-10-CM) - Other specified congenital deformities of feet                   Rehab Codes: M25.572 - Left ankle pain  Onset date: 10/29/23             Next 's appt.: Pending  Visit# / total visits: ;     Cancels/No Shows: 0/0    Subjective:    Pain:  [] Yes  [x] No Location: L ankle pain  Pain Rating: (0-10 scale) 10  Pain altered Tx:  [x] No  [] Yes  Action:  Comments: Pt reported that he does fairly well stated that if he moves it a certain way

## 2024-01-12 ENCOUNTER — HOSPITAL ENCOUNTER (OUTPATIENT)
Dept: PHYSICAL THERAPY | Facility: CLINIC | Age: 24
Setting detail: THERAPIES SERIES
Discharge: HOME | End: 2024-01-12
Payer: COMMERCIAL

## 2024-01-12 PROCEDURE — 97140 MANUAL THERAPY 1/> REGIONS: CPT

## 2024-01-12 PROCEDURE — 97110 THERAPEUTIC EXERCISES: CPT

## 2024-01-12 NOTE — FLOWSHEET NOTE
[] [facility]  Outpatient Rehabilitation &  Therapy  [address]  [phone]  F: [phone] [] [facility]  Outpatient Rehabilitation &  Therapy  [address]  P: [phone]  F: [phone] [] [facility]  Outpatient Rehabilitation &  Therapy  [address]  P: [phone]  F: [phone] [x] [facility]  Outpatient Rehabilitation &  Therapy  [address]  P: [phone]  F: [phone] [] [facility]  Outpatient Rehabilitation &  Therapy  [address]   P: [phone]  F: [phone]  [] [facility]  Outpatient Rehabilitation &  Therapy  [address]   P: [phone]  F: [phone] [] [facility]  Outpatient Rehabilitation &  Therapy  [address]  P: [phone]  F: [phone] [] [facility]  Outpatient Rehabilitation &  Therapy  [address] [facility]  [address]  P: [phone]  F: [phone] [] [facility]  Outpatient Rehabilitation &  Therapy  [address] [facility] [address]  P: [phone]  F: [phone]      Physical Therapy Daily Treatment Note    Date:  2024  Patient Name:  Bird Archer    :  2000  MRN: [id number]  Physician: Marvin Chanel DPM  Insurance: BCBS; Osito yr; 60/48vs; no auth req; hard max; PT/OT/ST/Pulm comb; no pt resp-Family OOP met for    Medical Diagnosis: Q66.89 (ICD-10-CM) - Other specified congenital deformities of feet                   Rehab Codes: M25.572 - Left ankle pain  Onset date: 10/29/23             Next 's appt.: Pending  Visit# / total visits: ;     Cancels/No Shows: 0/0    Subjective:    Pain:  [x] Yes  [] No Location: L ankle pain  Pain Rating: (0-10 scale) 5/10  Pain altered Tx:  [x] No  [] Yes  Action:  Comments: Pt reports a slight increase in pain to a level of 5/10 today, but reports

## 2024-01-23 ENCOUNTER — HOSPITAL ENCOUNTER (OUTPATIENT)
Dept: PHYSICAL THERAPY | Facility: CLINIC | Age: 24
Setting detail: THERAPIES SERIES
Discharge: HOME | End: 2024-01-23
Payer: COMMERCIAL

## 2024-01-23 PROCEDURE — 97140 MANUAL THERAPY 1/> REGIONS: CPT

## 2024-01-23 NOTE — FLOWSHEET NOTE
[] [facility]  Outpatient Rehabilitation &  Therapy  [address]  [phone]  F: [phone] [] [facility]  Outpatient Rehabilitation &  Therapy  [address]  P: [phone]  F: [phone] [] [facility]  Outpatient Rehabilitation &  Therapy  [address]  P: [phone]  F: [phone] [x] [facility]  Outpatient Rehabilitation &  Therapy  [address]  P: [phone]  F: [phone] [] [facility]  Outpatient Rehabilitation &  Therapy  [address]   P: [phone]  F: [phone]  [] [facility]  Outpatient Rehabilitation &  Therapy  [address]   P: [phone]  F: [phone] [] [facility]  Outpatient Rehabilitation &  Therapy  [address]  P: [phone]  F: [phone] [] [facility]  Outpatient Rehabilitation &  Therapy  [address] [facility]  [address]  P: [phone]  F: [phone] [] [facility]  Outpatient Rehabilitation &  Therapy  [address] [facility] [address]  P: [phone]  F: [phone]      Physical Therapy Daily Treatment Note    Date:  2024  Patient Name:  Bird Archer    :  2000  MRN: [id number]  Physician: Marvin Chanel DPM  Insurance: BCBS; Osito yr; 60/48vs; no auth req; hard max; PT/OT/ST/Pulm comb; no pt resp-Family OOP met for    Medical Diagnosis: Q66.89 (ICD-10-CM) - Other specified congenital deformities of feet                   Rehab Codes: M25.572 - Left ankle pain  Onset date: 10/29/23             Next 's appt.: Pending  Visit# / total visits: ;     Cancels/No Shows: 0/0    Subjective:    Pain:  [x] Yes  [] No Location: L ankle pain  Pain Rating: (0-10 scale) 4/10  Pain altered Tx:  [x] No  [] Yes  Action:  Comments: Pt reports pain at a level of 4/10 this AM.  He notes that he has had

## 2024-02-06 ENCOUNTER — HOSPITAL ENCOUNTER (OUTPATIENT)
Dept: PHYSICAL THERAPY | Facility: CLINIC | Age: 24
Setting detail: THERAPIES SERIES
Discharge: HOME | End: 2024-02-06
Payer: COMMERCIAL

## 2024-02-06 PROCEDURE — 97110 THERAPEUTIC EXERCISES: CPT

## 2024-02-06 PROCEDURE — 97140 MANUAL THERAPY 1/> REGIONS: CPT

## 2024-11-21 ENCOUNTER — HOSPITAL ENCOUNTER (OUTPATIENT)
Age: 24
Discharge: HOME | End: 2024-11-21

## 2024-11-21 ENCOUNTER — HOSPITAL ENCOUNTER (OUTPATIENT)
Dept: GENERAL RADIOLOGY | Age: 24
Discharge: HOME | End: 2024-11-21

## 2024-11-21 DIAGNOSIS — Z02.1 PHYSICAL EXAM, PRE-EMPLOYMENT: ICD-10-CM

## 2025-01-24 ENCOUNTER — HOSPITAL ENCOUNTER (EMERGENCY)
Age: 25
Discharge: HOME | End: 2025-01-24
Payer: COMMERCIAL

## 2025-01-24 VITALS
HEART RATE: 76 BPM | DIASTOLIC BLOOD PRESSURE: 88 MMHG | TEMPERATURE: 98.1 F | SYSTOLIC BLOOD PRESSURE: 124 MMHG | OXYGEN SATURATION: 98 % | RESPIRATION RATE: 18 BRPM

## 2025-01-24 DIAGNOSIS — J06.9 URI WITH COUGH AND CONGESTION: Primary | ICD-10-CM

## 2025-01-24 DIAGNOSIS — H92.03 OTALGIA OF BOTH EARS: ICD-10-CM

## 2025-01-24 PROCEDURE — 99213 OFFICE O/P EST LOW 20 MIN: CPT

## 2025-01-24 PROCEDURE — 99202 OFFICE O/P NEW SF 15 MIN: CPT | Performed by: NURSE PRACTITIONER

## 2025-01-24 RX ORDER — DEXTROMETHORPHAN HYDROBROMIDE AND PROMETHAZINE HYDROCHLORIDE 15; 6.25 MG/5ML; MG/5ML
5 SYRUP ORAL 4 TIMES DAILY PRN
Qty: 118 ML | Refills: 0 | Status: SHIPPED | OUTPATIENT
Start: 2025-01-24 | End: 2025-01-31

## 2025-01-24 ASSESSMENT — PAIN DESCRIPTION - LOCATION: LOCATION: EAR

## 2025-01-24 ASSESSMENT — ENCOUNTER SYMPTOMS
SHORTNESS OF BREATH: 0
SORE THROAT: 0
COUGH: 1

## 2025-01-24 ASSESSMENT — PAIN - FUNCTIONAL ASSESSMENT: PAIN_FUNCTIONAL_ASSESSMENT: 0-10

## 2025-01-24 ASSESSMENT — PAIN SCALES - GENERAL: PAINLEVEL_OUTOF10: 5

## 2025-01-24 ASSESSMENT — PAIN DESCRIPTION - ORIENTATION: ORIENTATION: RIGHT;LEFT

## 2025-01-24 NOTE — ED TRIAGE NOTES
Started this last Saturday, was flat on back with body aches/fever, which is better, now is having ear pain/plugged, head congestion, hurts to take a deep breath (left rear chest)

## 2025-01-24 NOTE — ED PROVIDER NOTES
[facility] URGENT CARE  Urgent Care Encounter       CHIEF COMPLAINT       Chief Complaint   Patient presents with    Ear Pain       Nurses Notes reviewed and I agree except as noted in the HPI.  HISTORY OF PRESENT ILLNESS   Bird Archer is a 24 y.o. male who presents with complaints of bilateral ear pain, congestion, cough.  He reports his symptoms started greater than 5 days ago.  He reports completing a telehealth visit in which she was prescribed Augmentin.  He denies any improvement in symptoms.  No reports of fever.  Had left posterior chest discomfort last evening that did resolve today.  Has been taking DayQuil and NyQuil for his cough and congestion.  No known exposure to illness.  Has 2 days left of Augmentin.    The history is provided by the patient.       REVIEW OF SYSTEMS     Review of Systems   Constitutional:  Negative for fatigue and fever.   HENT:  Positive for congestion and ear pain. Negative for sore throat.    Respiratory:  Positive for cough. Negative for shortness of breath.    Cardiovascular:  Positive for chest pain.   Musculoskeletal:  Negative for myalgias.   Neurological:  Negative for headaches.       PAST MEDICAL HISTORY         Diagnosis Date    Arthritis     Asthma     Patent foramen ovale     Thyroid disease        SURGICALHISTORY     Patient  has a past surgical history that includes Tonsillectomy and Foot surgery (Left, 10/23/2023).    CURRENT MEDICATIONS       Previous Medications    ALBUTEROL (PROVENTIL) (2.5 MG/3ML) 0.083% NEBULIZER SOLUTION    Take 3 mLs by nebulization every 6 hours as needed for Wheezing or Shortness of Breath    ASCORBIC ACID (VITAMIN C ER PO)    Take by mouth    FEXOFENADINE HCL (ALLEGRA ALLERGY PO)    Take 1 tablet by mouth     FLUTICASONE (FLONASE) 50 MCG/ACT NASAL SPRAY    INHALE 1 SPRAY IN EACH NOSTRIL ONE TIME A DAY    LEVOTHYROXINE SODIUM PO    Take by mouth    VENTOLIN  (90 BASE) MCG/ACT INHALER    INHALE 2 PUFFS ORALLY EVERY FOUR HOURS

## 2025-06-16 ENCOUNTER — TELEPHONE (OUTPATIENT)
Age: 25
End: 2025-06-16

## 2025-06-27 ENCOUNTER — RESULTS FOLLOW-UP (OUTPATIENT)
Age: 25
End: 2025-06-27

## 2025-06-27 ENCOUNTER — LAB (OUTPATIENT)
Dept: LAB | Age: 25
End: 2025-06-27

## 2025-06-27 ENCOUNTER — OFFICE VISIT (OUTPATIENT)
Age: 25
End: 2025-06-27
Payer: COMMERCIAL

## 2025-06-27 VITALS
HEART RATE: 64 BPM | WEIGHT: 187 LBS | HEIGHT: 73 IN | SYSTOLIC BLOOD PRESSURE: 108 MMHG | BODY MASS INDEX: 24.78 KG/M2 | TEMPERATURE: 97.9 F | OXYGEN SATURATION: 98 % | DIASTOLIC BLOOD PRESSURE: 70 MMHG

## 2025-06-27 DIAGNOSIS — Z85.850 HX OF PAPILLARY THYROID CARCINOMA: ICD-10-CM

## 2025-06-27 DIAGNOSIS — Z00.00 ENCOUNTER FOR WELL ADULT EXAM WITHOUT ABNORMAL FINDINGS: ICD-10-CM

## 2025-06-27 DIAGNOSIS — Z76.89 ENCOUNTER TO ESTABLISH CARE WITH NEW PROVIDER: ICD-10-CM

## 2025-06-27 DIAGNOSIS — Z76.89 ENCOUNTER TO ESTABLISH CARE WITH NEW PROVIDER: Primary | ICD-10-CM

## 2025-06-27 LAB
ALBUMIN SERPL BCG-MCNC: 4.9 G/DL (ref 3.4–4.9)
ALP SERPL-CCNC: 110 U/L (ref 40–129)
ALT SERPL W/O P-5'-P-CCNC: 23 U/L (ref 10–50)
ANION GAP SERPL CALC-SCNC: 11 MEQ/L (ref 8–16)
AST SERPL-CCNC: 24 U/L (ref 10–50)
BASOPHILS ABSOLUTE: 0.1 THOU/MM3 (ref 0–0.1)
BASOPHILS NFR BLD AUTO: 1.1 %
BILIRUB SERPL-MCNC: 0.4 MG/DL (ref 0.3–1.2)
BUN SERPL-MCNC: 14 MG/DL (ref 8–23)
CALCIUM SERPL-MCNC: 10 MG/DL (ref 8.6–10)
CHLORIDE SERPL-SCNC: 107 MEQ/L (ref 98–111)
CO2 SERPL-SCNC: 24 MEQ/L (ref 22–29)
CREAT SERPL-MCNC: 0.9 MG/DL (ref 0.7–1.2)
DEPRECATED RDW RBC AUTO: 41.1 FL (ref 35–45)
EOSINOPHIL NFR BLD AUTO: 2.5 %
EOSINOPHILS ABSOLUTE: 0.1 THOU/MM3 (ref 0–0.4)
ERYTHROCYTE [DISTWIDTH] IN BLOOD BY AUTOMATED COUNT: 12 % (ref 11.5–14.5)
GFR SERPL CREATININE-BSD FRML MDRD: > 90 ML/MIN/1.73M2
GLUCOSE FASTING: 89 MG/DL (ref 74–109)
GLUCOSE SERPL-MCNC: 89 MG/DL (ref 74–109)
HCT VFR BLD AUTO: 45.1 % (ref 42–52)
HGB BLD-MCNC: 15.2 GM/DL (ref 14–18)
IMM GRANULOCYTES # BLD AUTO: 0.01 THOU/MM3 (ref 0–0.07)
IMM GRANULOCYTES NFR BLD AUTO: 0.2 %
LYMPHOCYTES ABSOLUTE: 1.7 THOU/MM3 (ref 1–4.8)
LYMPHOCYTES NFR BLD AUTO: 31.8 %
MCH RBC QN AUTO: 31.3 PG (ref 26–33)
MCHC RBC AUTO-ENTMCNC: 33.7 GM/DL (ref 32.2–35.5)
MCV RBC AUTO: 92.8 FL (ref 80–94)
MONOCYTES ABSOLUTE: 0.5 THOU/MM3 (ref 0.4–1.3)
MONOCYTES NFR BLD AUTO: 8.5 %
NEUTROPHILS ABSOLUTE: 3 THOU/MM3 (ref 1.8–7.7)
NEUTROPHILS NFR BLD AUTO: 55.9 %
NRBC BLD AUTO-RTO: 0 /100 WBC
PLATELET # BLD AUTO: 226 THOU/MM3 (ref 130–400)
PMV BLD AUTO: 10.7 FL (ref 9.4–12.4)
POTASSIUM SERPL-SCNC: 5.2 MEQ/L (ref 3.5–5.2)
PROT SERPL-MCNC: 7.6 G/DL (ref 6.4–8.3)
RBC # BLD AUTO: 4.86 MILL/MM3 (ref 4.7–6.1)
SODIUM SERPL-SCNC: 142 MEQ/L (ref 135–145)
T3 TOTAL: 130 NG/DL (ref 80–200)
T4 FREE SERPL-MCNC: 1.3 NG/DL (ref 0.92–1.68)
TSH SERPL DL<=0.05 MIU/L-ACNC: 2.8 UIU/ML (ref 0.27–4.2)
WBC # BLD AUTO: 5.3 THOU/MM3 (ref 4.8–10.8)

## 2025-06-27 PROCEDURE — 99385 PREV VISIT NEW AGE 18-39: CPT | Performed by: NURSE PRACTITIONER

## 2025-06-27 RX ORDER — FEXOFENADINE HCL 60 MG/1
TABLET, FILM COATED ORAL
COMMUNITY
End: 2025-06-27

## 2025-06-27 RX ORDER — LEVOTHYROXINE SODIUM 75 UG/1
75 TABLET ORAL EVERY MORNING
COMMUNITY

## 2025-06-27 SDOH — ECONOMIC STABILITY: FOOD INSECURITY: WITHIN THE PAST 12 MONTHS, YOU WORRIED THAT YOUR FOOD WOULD RUN OUT BEFORE YOU GOT MONEY TO BUY MORE.: NEVER TRUE

## 2025-06-27 SDOH — ECONOMIC STABILITY: FOOD INSECURITY: WITHIN THE PAST 12 MONTHS, THE FOOD YOU BOUGHT JUST DIDN'T LAST AND YOU DIDN'T HAVE MONEY TO GET MORE.: NEVER TRUE

## 2025-06-27 ASSESSMENT — PATIENT HEALTH QUESTIONNAIRE - PHQ9
SUM OF ALL RESPONSES TO PHQ QUESTIONS 1-9: 0
2. FEELING DOWN, DEPRESSED OR HOPELESS: NOT AT ALL
SUM OF ALL RESPONSES TO PHQ QUESTIONS 1-9: 0
SUM OF ALL RESPONSES TO PHQ QUESTIONS 1-9: 0
1. LITTLE INTEREST OR PLEASURE IN DOING THINGS: NOT AT ALL
SUM OF ALL RESPONSES TO PHQ QUESTIONS 1-9: 0

## 2025-06-27 ASSESSMENT — ENCOUNTER SYMPTOMS
NAUSEA: 0
ABDOMINAL PAIN: 0
SHORTNESS OF BREATH: 0
TROUBLE SWALLOWING: 0
SORE THROAT: 0
VOMITING: 0

## 2025-06-27 NOTE — PROGRESS NOTES
[facility]  [address] [facility] [address]  Dept: [phone]  Loc: [phone]     Bird Archer (:  2000) is a 25 y.o. male, here for evaluation of the following chief complaint(s):  SUBJECTIVE       Pt presents to establish PCP- previous provider was Dr. Anuj Enrique.    History of Present Illness  The patient presents to establish care and for a wellness visit.    He was previously under the care of Dr. Anuj Enrique, who has since relocated to [place]. His medical history includes thyroid cancer diagnosed last year, underwent a hemithyroidectomy for papillary thyroid cancer for which he is seeking blood work today. He continues to be under the care of an endocrinologist but has been unable to schedule a follow-up appointment due to work commitments. Last visit with Dr. Elie Marinelli MD in [place] was 24.  He was recommended to follow with a  workup due to his niece having MEN-2 as well.  However, he has not pursued the visit.  He experiences certain throat pain when he turns his head in a particular way, a symptom that has persisted since his surgery. He is currently on a daily dose of levothyroxine 75 mcg.    He underwent ankle surgery two years ago and has known food allergies. He does not take any medications for these conditions. He reports no skin rashes or excessive dryness. He has received all necessary vaccinations and does not believe he requires any additional ones. He has not had chickenpox or the varicella vaccine and declines the offer for it. He also declines further doses of the COVID-19 vaccine, as well as the meningitis and pneumonia vaccines.    SOCIAL HISTORY  He works at [employer] as an .    FAMILY HISTORY  His niece has MEN2B.    ALLERGIES  The patient has food allergies.    MEDICATIONS  Levothyroxine 75 mcg.    IMMUNIZATIONS  He is up to date on his childhood

## 2025-06-29 LAB — CALCIT SERPL-MCNC: NORMAL NG/L

## 2025-06-30 LAB
THYROGLOBULIN ANTIBODY: < 12 IU/ML (ref 0–40)
THYROPEROXIDASE AB SERPL IA-ACNC: < 4 IU/ML (ref 0–25)

## 2025-07-02 LAB — THYROGLOB SERPL-MCNC: 7.4 NG/ML (ref 1.3–31.8)

## 2025-07-12 ENCOUNTER — HOSPITAL ENCOUNTER (EMERGENCY)
Age: 25
Discharge: HOME | End: 2025-07-12
Payer: COMMERCIAL

## 2025-07-12 VITALS
RESPIRATION RATE: 18 BRPM | HEART RATE: 70 BPM | DIASTOLIC BLOOD PRESSURE: 84 MMHG | TEMPERATURE: 98.1 F | WEIGHT: 180 LBS | OXYGEN SATURATION: 97 % | SYSTOLIC BLOOD PRESSURE: 120 MMHG | BODY MASS INDEX: 23.75 KG/M2

## 2025-07-12 DIAGNOSIS — B08.4 HAND, FOOT AND MOUTH DISEASE: Primary | ICD-10-CM

## 2025-07-12 PROCEDURE — 99213 OFFICE O/P EST LOW 20 MIN: CPT | Performed by: NURSE PRACTITIONER

## 2025-07-12 PROCEDURE — 99213 OFFICE O/P EST LOW 20 MIN: CPT

## 2025-07-12 RX ORDER — DIPHENHYDRAMINE HCL 12.5 MG/5ML
12.5 SOLUTION ORAL 4 TIMES DAILY PRN
Qty: 118 ML | Refills: 0 | Status: SHIPPED | OUTPATIENT
Start: 2025-07-12 | End: 2025-08-11

## 2025-07-12 RX ORDER — IBUPROFEN 400 MG/1
400 TABLET, FILM COATED ORAL EVERY 6 HOURS PRN
Qty: 60 TABLET | Refills: 0 | Status: SHIPPED | OUTPATIENT
Start: 2025-07-12

## 2025-07-12 RX ORDER — CALCIUM CARBONATE 500 MG/1
1 TABLET, CHEWABLE ORAL DAILY
COMMUNITY
Start: 2025-07-12 | End: 2025-08-11

## 2025-07-12 ASSESSMENT — PAIN DESCRIPTION - ORIENTATION: ORIENTATION: RIGHT;LEFT

## 2025-07-12 ASSESSMENT — PAIN DESCRIPTION - ONSET: ONSET: PROGRESSIVE

## 2025-07-12 ASSESSMENT — ENCOUNTER SYMPTOMS
COUGH: 0
WHEEZING: 0
VOMITING: 0
NAUSEA: 0
APNEA: 0
CHOKING: 0
THROAT SWELLING: 0
SHORTNESS OF BREATH: 0
HOARSE VOICE: 0
CHEST TIGHTNESS: 0
STRIDOR: 0
PERI-ORBITAL EDEMA: 0
ABDOMINAL PAIN: 0
SORE THROAT: 0
DIARRHEA: 0

## 2025-07-12 ASSESSMENT — PAIN - FUNCTIONAL ASSESSMENT
PAIN_FUNCTIONAL_ASSESSMENT: PREVENTS OR INTERFERES SOME ACTIVE ACTIVITIES AND ADLS
PAIN_FUNCTIONAL_ASSESSMENT: 0-10

## 2025-07-12 ASSESSMENT — PAIN DESCRIPTION - DESCRIPTORS: DESCRIPTORS: ACHING

## 2025-07-12 ASSESSMENT — PAIN SCALES - GENERAL: PAINLEVEL_OUTOF10: 2

## 2025-07-12 ASSESSMENT — PAIN DESCRIPTION - FREQUENCY: FREQUENCY: CONTINUOUS

## 2025-07-12 ASSESSMENT — PAIN DESCRIPTION - LOCATION: LOCATION: FOOT;HAND

## 2025-07-12 ASSESSMENT — PAIN DESCRIPTION - PAIN TYPE: TYPE: ACUTE PAIN

## 2025-07-12 NOTE — ED PROVIDER NOTES
[facility] URGENT CARE  Urgent Care Encounter      CHIEF COMPLAINT       Chief Complaint   Patient presents with    Rash     Bilateral hands and feet    Pharyngitis    Fever     89 on Tuesday       Nurses Notes reviewed and I agree except as noted in the HPI.  HISTORY OFPRESENT ILLNESS   Bird Archer is a 25 y.o.  The history is provided by the patient. No  was used.   Rash  Location:  Mouth, foot and finger  Mouth rash location: pharnyx.  Foot rash location:  Sole of R foot and sole of L foot  Quality: painful    Pain details:     Quality:  Aching    Severity:  Mild    Onset quality:  Gradual    Duration:  4 days    Timing:  Constant    Progression:  Worsening  Severity:  Moderate  Onset quality:  Sudden  Timing:  Constant  Progression:  Worsening  Chronicity:  New  Context: not animal contact, not chemical exposure, not diapers, not eggs, not exposure to similar rash, not food, not hot tub use, not insect bite/sting, not medications, not new detergent/soap, not nuts, not plant contact, not pollen, not pregnancy, not sick contacts and not sun exposure    Relieved by:  Nothing  Worsened by:  Heat and moisture  Associated symptoms: induration    Associated symptoms: no abdominal pain, no diarrhea, no fatigue, no fever, no headaches, no hoarse voice, no joint pain, no myalgias, no nausea, no periorbital edema, no shortness of breath, no sore throat, no throat swelling, no tongue swelling, no URI, not vomiting and not wheezing        REVIEW OF SYSTEMS     Review of Systems   Constitutional:  Negative for activity change, appetite change, chills, diaphoresis, fatigue and fever.   HENT:  Negative for hoarse voice and sore throat.    Respiratory:  Negative for apnea, cough, choking, chest tightness, shortness of breath, wheezing and stridor.    Cardiovascular:  Negative for chest pain, palpitations and leg swelling.   Gastrointestinal:  Negative for abdominal pain, diarrhea, nausea and vomiting.    Musculoskeletal:  Negative for arthralgias and myalgias.   Skin:  Positive for rash.   Neurological:  Negative for headaches.       PAST MEDICAL HISTORY         Diagnosis Date    Arthritis     Asthma     Hx of papillary thyroid carcinoma     Patent foramen ovale     Thyroid disease        SURGICAL HISTORY     Patient  has a past surgical history that includes Tonsillectomy and Foot surgery (Left, 10/23/2023).    CURRENT MEDICATIONS       Discharge Medication List as of 7/12/2025  2:22 PM        CONTINUE these medications which have NOT CHANGED    Details   levothyroxine (SYNTHROID) 75 MCG tablet Take 1 tablet by mouth every morningHistorical Med      Fexofenadine HCl (ALLEGRA ALLERGY PO) Take 1 tablet by mouth Historical Med      VENTOLIN  (90 Base) MCG/ACT inhaler INHALE 2 PUFFS ORALLY EVERY FOUR HOURS AS NEEDED, R-2, DAWHistorical Med      albuterol (PROVENTIL) (2.5 MG/3ML) 0.083% nebulizer solution Take 3 mLs by nebulization every 6 hours as needed for Wheezing or Shortness of Breath, Disp-120 vial, R-0Normal             ALLERGIES     Patient is is allergic to fish allergy, nuts [peanut-containing drug products], shellfish-derived products, and bee venom.    FAMILY HISTORY     Patient's family history is not on file.    SOCIAL HISTORY     Patient  reports that he has never smoked. He has never used smokeless tobacco. He reports current alcohol use. He reports that he does not use drugs.    PHYSICAL EXAM     ED TRIAGE VITALS  BP: 120/84, Temp: 98.1 °F (36.7 °C), Pulse: 70, Respirations: 18, SpO2: 97 %  Physical Exam  Vitals and nursing note reviewed.   Constitutional:       General: He is not in acute distress.     Appearance: He is well-developed and normal weight. He is not ill-appearing, toxic-appearing or diaphoretic.   HENT:      Head: Normocephalic and atraumatic.      Right Ear: No drainage, swelling or tenderness. No middle ear effusion. Tympanic membrane is not erythematous.      Left Ear: No

## 2025-07-12 NOTE — DISCHARGE INSTRUCTIONS
Suggestions for symptom management that are available over the counter.   If you have questions regarding allergies or contraindications to use, please speak to the pharmacy staff or your family provider.    For fevers or pain: acetaminophen (Tylenol), ibuprofen (Motrin)  For dry cough: medications containing dextromethorphan, such as Delsym, Robitussin DM or Mucinex DM and medicated throat lozenges  For congestion or sinus pressure: decongestant nasal sprays, such as Afrin (for up to 3 days), nasal steroid sprays, such as Flonase, Sensimist, Rhinocort or Nasonex and saline nasal sprays, neti pot or sinus rinse bottle  For runny nose, sneezing or watery/itchy eyes: less sedating antihistamines, such as loratidine (Claritin), fexofenadine (Allegra) or Cetirizine (Zyrtec) and antihistamine eye drops, such as ketotifen (Zaditor, Alaway) or olopatadine (Pataday)  For sore throat:  Chloraseptic throat spray or sore throat lozenges or  Mucinex Instasoothe Sore Throat & Pain Cherry Spray  If you have high blood pressure, a brand like Coricidin HBP may be an option.you should avoid medications containing pseudoephedrine or phenylephrine, such as Sudafed,  running a humidifier in your bedroom may be helpful for many of your symptoms.  If your cough is keeping you awake at night, you can try raising your head with an extra pillow.  If the skin around your nose and lips becomes sore, you can put some petroleum jelly on the area.      Suggestions for over-the-counter supplements to help your immune system, if you have questions regarding allergies or contraindications to use, please speak to the pharmacy staff or your family provider.    Multi-vitamin/multi-mineral daily   Vitamin D3 3000 IU daily  Zinc 30 mg daily  Vitamin C 1000 mg twice daily  B-100 complex as daily as directed on bottle  Probiotic/Prebiotic marrero  Gargle with mouthwash 3 times daily, may use Scope, Crest, or Listerine.    COLD-EEZE  over the counter: Use

## (undated) DEVICE — Device

## (undated) DEVICE — STAZ LOWER EXTREMITY: Brand: MEDLINE INDUSTRIES, INC.

## (undated) DEVICE — DRESSING,GAUZE,XEROFORM,CURAD,1"X8",ST: Brand: CURAD

## (undated) DEVICE — YANKAUER,FLEXIBLE HANDLE,REGLR CAPACITY: Brand: MEDLINE INDUSTRIES, INC.

## (undated) DEVICE — GOWN,SIRUS,NONRNF,SETINSLV,XL,20/CS: Brand: MEDLINE

## (undated) DEVICE — GLOVE SURG SZ 8 L12IN FNGR THK79MIL GRN LTX FREE

## (undated) DEVICE — SKIN PREP TRAY W/CHG: Brand: MEDLINE INDUSTRIES, INC.

## (undated) DEVICE — DRAPE,REIN 53X77,STERILE: Brand: MEDLINE

## (undated) DEVICE — BLADE,CARBON-STEEL,15,STRL,DISPOSABLE,TB: Brand: MEDLINE

## (undated) DEVICE — PRECISION THIN (9.0 X 0.38 X 25.0MM)

## (undated) DEVICE — SET,IRRIGATION,CYSTO/TUR,90": Brand: MEDLINE

## (undated) DEVICE — SUTURE MCRYL 3-0 L27IN ABSRB VLT SH L26MM 1/2 CIR Y316H

## (undated) DEVICE — SOLUTION IV IRRIG 500ML 0.9% SODIUM CHL 2F7123

## (undated) DEVICE — STRIP SKIN CLSR W0.25XL4IN WHT SPUNBOUND FBR NYL HI ADH

## (undated) DEVICE — SUTURE PROL SZ 4-0 L30IN NONABSORBABLE BLU SH L26MM 1/2 CIR 8831H

## (undated) DEVICE — GLOVE ORTHO 8   MSG9480